# Patient Record
Sex: FEMALE | Race: WHITE | HISPANIC OR LATINO | URBAN - METROPOLITAN AREA
[De-identification: names, ages, dates, MRNs, and addresses within clinical notes are randomized per-mention and may not be internally consistent; named-entity substitution may affect disease eponyms.]

---

## 2022-08-04 ENCOUNTER — OFFICE VISIT (OUTPATIENT)
Dept: OBGYN CLINIC | Facility: CLINIC | Age: 32
End: 2022-08-04
Payer: COMMERCIAL

## 2022-08-04 VITALS
DIASTOLIC BLOOD PRESSURE: 70 MMHG | WEIGHT: 191 LBS | HEIGHT: 65 IN | BODY MASS INDEX: 31.82 KG/M2 | SYSTOLIC BLOOD PRESSURE: 118 MMHG

## 2022-08-04 DIAGNOSIS — N91.2 AMENORRHEA: Primary | ICD-10-CM

## 2022-08-04 LAB — SL AMB POCT URINE HCG: POSITIVE

## 2022-08-04 PROCEDURE — 81025 URINE PREGNANCY TEST: CPT | Performed by: PHYSICIAN ASSISTANT

## 2022-08-04 PROCEDURE — 99214 OFFICE O/P EST MOD 30 MIN: CPT | Performed by: PHYSICIAN ASSISTANT

## 2022-08-04 PROCEDURE — 76801 OB US < 14 WKS SINGLE FETUS: CPT | Performed by: PHYSICIAN ASSISTANT

## 2022-08-04 NOTE — PROGRESS NOTES
Assessment/Plan:  - Viable di-di twin IUP @ 8w4d by LMP  - DOM 03-  - Continue PNV  - Call for concerns  - RTO 2 weeks for OB intake     Diagnoses and all orders for this visit:    Amenorrhea  -     POCT urine HCG  -     AMB US OB < 14 weeks single or first gestation level 1  -     Ambulatory Referral to Maternal Fetal Medicine; Future    Other orders  -     Prenatal Vit-Fe Fumarate-FA (PRENATAL VITAMIN PO); Take by mouth          Subjective:      Patient ID: Mable Bhardwaj is a 27 y o  female  Nazario Munoz is a 24YO [id number] female presenting to the office for pregnancy confiramtion via [address]  Patient had a chemical pregnancy in June  She did not have a period between the chemical pregnancy passing and now  She was bleeding on 06/05/22  The following portions of the patient's history were reviewed and updated as appropriate: allergies, current medications, past family history, past medical history, past social history, past surgical history and problem list     Review of Systems   Constitutional: Negative for chills, fever and unexpected weight change  Respiratory: Negative for shortness of breath  Cardiovascular: Negative for chest pain  Gastrointestinal: Negative for abdominal pain, diarrhea, nausea and vomiting  Skin: Negative for rash  Objective:      /70   Ht 5' 5" (1 651 m)   Wt 86 6 kg (191 lb)   LMP 06/04/2022 (Exact Date)   Breastfeeding No   BMI 31 78 kg/m²          Physical Exam  Vitals reviewed  Constitutional:       Appearance: Normal appearance  She is normal weight  HENT:      Head: Normocephalic and atraumatic  Pulmonary:      Effort: Pulmonary effort is normal    Genitourinary:     General: Normal vulva  Labia:         Right: No rash or lesion  Left: No rash or lesion         Comments:   TVUS reveals Di-di twin IUP, 2 yolk sac, 2 fetal pole, +CM x 2    Fetus A  2 22cm (8w6d)   bpm      Fetus B  2 03cm (8w4d)   bpm    Skin: General: Skin is warm and dry  Neurological:      General: No focal deficit present  Mental Status: She is alert  Psychiatric:         Mood and Affect: Mood normal          Behavior: Behavior normal            Ultrasound Probe Disinfection    A transvaginal ultrasound was performed     Prior to use, disinfection was performed with High Level Disinfection Process (Trophon)  Probe serial number [id number]: [id number] was used    Yogesh Jacobs PA-C  08/04/22  3:28 PM

## 2022-08-15 ENCOUNTER — TELEPHONE (OUTPATIENT)
Dept: PERINATAL CARE | Facility: OTHER | Age: 32
End: 2022-08-15

## 2022-08-15 ENCOUNTER — INITIAL PRENATAL (OUTPATIENT)
Dept: OBGYN CLINIC | Facility: CLINIC | Age: 32
End: 2022-08-15

## 2022-08-15 VITALS
SYSTOLIC BLOOD PRESSURE: 122 MMHG | BODY MASS INDEX: 30.82 KG/M2 | HEIGHT: 66 IN | DIASTOLIC BLOOD PRESSURE: 70 MMHG | WEIGHT: 191.8 LBS

## 2022-08-15 DIAGNOSIS — Z3A.10 10 WEEKS GESTATION OF PREGNANCY: Primary | ICD-10-CM

## 2022-08-15 PROCEDURE — OBC

## 2022-08-15 NOTE — TELEPHONE ENCOUNTER
Pt called asking about her twin nuchal that she needs between 9/1 to 9/8 at the moment we do not have any openings so I did tell pt I will put her on our waiting list and she is fine with that

## 2022-08-31 PROBLEM — O30.041 DICHORIONIC DIAMNIOTIC TWIN PREGNANCY IN FIRST TRIMESTER: Status: ACTIVE | Noted: 2022-08-31

## 2022-08-31 NOTE — PATIENT INSTRUCTIONS
Thank you for choosing us for your  care today  If you have any questions about your ultrasound or care, please do not hesitate to contact us or your primary obstetrician  Some general instructions for your pregnancy are:    Protect against coronavirus: get vaccinated - pregnant women are increased risk of severe COVID  Notify your primary care doctor if you have any symptoms  Exercise: Aim for 22 minutes per day (150 minutes per week) of regular exercise  Walking is great! Nutrition: aim for calcium-rich and iron-rich foods as well as healthy sources of protein  Learn about Preeclampsia: preeclampsia is a common, serious high blood pressure complication in pregnancy  A blood pressure of 815TVCU (systolic or top number) or 75SKQN (diastolic or bottom number) is not normal and needs evaluation by your doctor  Aspirin is sometimes prescribed in early pregnancy to prevent preeclampsia in women with risk factors - ask your obstetrician if you should be on this medication  If you smoke, try to reduce how many cigarettes you smoke or try to quit completely  Do not vape  Other warning signs to watch out for in pregnancy or postpartum: chest pain, obstructed breathing or shortness of breath, seizures, thoughts of hurting yourself or your baby, bleeding, a painful or swollen leg, fever, or headache (see AWHONN POST-BIRTH Warning Signs campaign)  If these happen call 911  Itching is also not normal in pregnancy and if you experience this, especially over your hands and feet, potentially worse at night, notify your doctors

## 2022-09-01 ENCOUNTER — APPOINTMENT (OUTPATIENT)
Dept: LAB | Facility: CLINIC | Age: 32
End: 2022-09-01
Payer: COMMERCIAL

## 2022-09-01 ENCOUNTER — ROUTINE PRENATAL (OUTPATIENT)
Dept: PERINATAL CARE | Facility: CLINIC | Age: 32
End: 2022-09-01
Payer: COMMERCIAL

## 2022-09-01 VITALS
SYSTOLIC BLOOD PRESSURE: 128 MMHG | WEIGHT: 197 LBS | HEART RATE: 88 BPM | BODY MASS INDEX: 31.66 KG/M2 | DIASTOLIC BLOOD PRESSURE: 74 MMHG | HEIGHT: 66 IN

## 2022-09-01 DIAGNOSIS — N91.2 AMENORRHEA: ICD-10-CM

## 2022-09-01 DIAGNOSIS — Z3A.12 12 WEEKS GESTATION OF PREGNANCY: ICD-10-CM

## 2022-09-01 DIAGNOSIS — O30.041 DICHORIONIC DIAMNIOTIC TWIN PREGNANCY IN FIRST TRIMESTER: Primary | ICD-10-CM

## 2022-09-01 DIAGNOSIS — Z36.82 ENCOUNTER FOR (NT) NUCHAL TRANSLUCENCY SCAN: ICD-10-CM

## 2022-09-01 DIAGNOSIS — Z3A.10 10 WEEKS GESTATION OF PREGNANCY: ICD-10-CM

## 2022-09-01 LAB
ABO GROUP BLD: NORMAL
BASOPHILS # BLD AUTO: 0.01 THOUSANDS/ÂΜL (ref 0–0.1)
BASOPHILS NFR BLD AUTO: 0 % (ref 0–1)
BLD GP AB SCN SERPL QL: NEGATIVE
EOSINOPHIL # BLD AUTO: 0.04 THOUSAND/ÂΜL (ref 0–0.61)
EOSINOPHIL NFR BLD AUTO: 1 % (ref 0–6)
ERYTHROCYTE [DISTWIDTH] IN BLOOD BY AUTOMATED COUNT: 12.9 % (ref 11.6–15.1)
HCT VFR BLD AUTO: 34.2 % (ref 34.8–46.1)
HGB BLD-MCNC: 11.3 G/DL (ref 11.5–15.4)
IMM GRANULOCYTES # BLD AUTO: 0.03 THOUSAND/UL (ref 0–0.2)
IMM GRANULOCYTES NFR BLD AUTO: 0 % (ref 0–2)
LYMPHOCYTES # BLD AUTO: 1.87 THOUSANDS/ÂΜL (ref 0.6–4.47)
LYMPHOCYTES NFR BLD AUTO: 28 % (ref 14–44)
MCH RBC QN AUTO: 31 PG (ref 26.8–34.3)
MCHC RBC AUTO-ENTMCNC: 33 G/DL (ref 31.4–37.4)
MCV RBC AUTO: 94 FL (ref 82–98)
MONOCYTES # BLD AUTO: 0.38 THOUSAND/ÂΜL (ref 0.17–1.22)
MONOCYTES NFR BLD AUTO: 6 % (ref 4–12)
NEUTROPHILS # BLD AUTO: 4.39 THOUSANDS/ÂΜL (ref 1.85–7.62)
NEUTS SEG NFR BLD AUTO: 65 % (ref 43–75)
NRBC BLD AUTO-RTO: 0 /100 WBCS
PLATELET # BLD AUTO: 226 THOUSANDS/UL (ref 149–390)
PMV BLD AUTO: 10.6 FL (ref 8.9–12.7)
RBC # BLD AUTO: 3.64 MILLION/UL (ref 3.81–5.12)
RH BLD: POSITIVE
SPECIMEN EXPIRATION DATE: NORMAL
WBC # BLD AUTO: 6.72 THOUSAND/UL (ref 4.31–10.16)

## 2022-09-01 PROCEDURE — 76813 OB US NUCHAL MEAS 1 GEST: CPT | Performed by: OBSTETRICS & GYNECOLOGY

## 2022-09-01 PROCEDURE — 80081 OBSTETRIC PANEL INC HIV TSTG: CPT

## 2022-09-01 PROCEDURE — 86803 HEPATITIS C AB TEST: CPT

## 2022-09-01 PROCEDURE — 99242 OFF/OP CONSLTJ NEW/EST SF 20: CPT | Performed by: OBSTETRICS & GYNECOLOGY

## 2022-09-01 PROCEDURE — 36415 COLL VENOUS BLD VENIPUNCTURE: CPT

## 2022-09-01 PROCEDURE — 76814 OB US NUCHAL MEAS ADD-ON: CPT | Performed by: OBSTETRICS & GYNECOLOGY

## 2022-09-01 PROCEDURE — 36415 COLL VENOUS BLD VENIPUNCTURE: CPT | Performed by: OBSTETRICS & GYNECOLOGY

## 2022-09-01 PROCEDURE — 87086 URINE CULTURE/COLONY COUNT: CPT

## 2022-09-01 PROCEDURE — 86787 VARICELLA-ZOSTER ANTIBODY: CPT

## 2022-09-01 RX ORDER — ASPIRIN 81 MG/1
162 TABLET ORAL DAILY
Qty: 180 TABLET | Refills: 0 | Status: SHIPPED | OUTPATIENT
Start: 2022-09-01 | End: 2022-11-30

## 2022-09-01 NOTE — PROGRESS NOTES
Patient chose to have [facility] Non-invasive Prenatal Screen  Patient given brochure and is aware [facility] will contact patients insurance and coordinate coverage  Patient made aware she will need to respond to text message or e-mail from [employer] within 2 business days or testing will be run through insurance  Patient informed text message will come from area code  "415"  Provided [address] [place] [address] # [phone] and web site : [email or web address]     2 vials of blood drawn from right arm, patient tolerated blood draw without difficulty  Specimens labeled with patient identifiers (name, date of birth, specimen collection date), order and specimen was verified with patient, packed and sent via [employer]  Copy of lab order scanned to [employer]  Lab paperwork filled by nurse Anastasia Fernandez  Maternal Fetal Medicine will have results in approximately 7-10 business days and will call patient or notify via [address]  Patient aware viewing lab result online will reveal fetal sex If ordered  Patient verbalized understanding of all instructions and no questions at this time

## 2022-09-01 NOTE — PROGRESS NOTES
[address]: Ms Valeriano Hinojosa was seen today for nuchal translucency ultrasound  See ultrasound report under "OB Procedures" tab  Review of Systems   Constitutional: Negative for chills, fever and unexpected weight change  HENT: Negative for congestion, dental problem, facial swelling and sore throat  Eyes: Negative for visual disturbance  Respiratory: Negative for cough and shortness of breath  Cardiovascular: Negative for chest pain and palpitations  Gastrointestinal: Negative for diarrhea and vomiting  Endocrine: Negative for polydipsia  Genitourinary: Negative for dysuria and vaginal bleeding  Musculoskeletal: Negative for back pain and joint swelling  Skin: Negative for rash and wound  Allergic/Immunologic: Negative for immunocompromised state  Neurological: Negative for seizures and headaches  Hematological: Does not bruise/bleed easily  Psychiatric/Behavioral: Negative for hallucinations and suicidal ideas  Physical Exam  Constitutional:       General: She is not in acute distress  Appearance: Normal appearance  She is not ill-appearing, toxic-appearing or diaphoretic  HENT:      Head: Normocephalic and atraumatic  Nose: No congestion or rhinorrhea  Eyes:      General: No scleral icterus  Right eye: No discharge  Left eye: No discharge  Extraocular Movements: Extraocular movements intact  Conjunctiva/sclera: Conjunctivae normal    Pulmonary:      Effort: Pulmonary effort is normal  No respiratory distress  Musculoskeletal:      Cervical back: Normal range of motion  Skin:     Coloration: Skin is not jaundiced or pale  Findings: No erythema, lesion or rash  Neurological:      General: No focal deficit present  Mental Status: She is alert and oriented to person, place, and time     Psychiatric:         Mood and Affect: Mood normal          Behavior: Behavior normal          Please don't hesitate to contact our office with any concerns or questions    Cynthia Huerta MD

## 2022-09-01 NOTE — LETTER
2022    Ubaldo Hoang, [address]  [address] [facility]    Patient: Elza Del Real   YOB: 1990   Date of Visit: 2022   Gestational age 13w2d   Mayte Enrique of this communication: Routine       Dear Jose Del Real,    This patient was seen recently in our  office  The content of my evaluation today is in the ultrasound report under "OB Procedures" tab  Please don't hesitate to contact our office with any concerns or questions       Sincerely,      Jojo Marquez MD  Attending Physician, [place]

## 2022-09-02 LAB
HBV SURFACE AG SER QL: NORMAL
HCV AB SER QL: NORMAL
HIV 1+2 AB+HIV1 P24 AG SERPL QL IA: NORMAL
RPR SER QL: NORMAL
RUBV IGG SERPL IA-ACNC: 67.3 IU/ML
VZV IGG SER QL IA: ABNORMAL

## 2022-09-03 LAB — BACTERIA UR CULT: NORMAL

## 2022-09-07 ENCOUNTER — ROUTINE PRENATAL (OUTPATIENT)
Dept: OBGYN CLINIC | Facility: CLINIC | Age: 32
End: 2022-09-07

## 2022-09-07 VITALS — WEIGHT: 199.6 LBS | SYSTOLIC BLOOD PRESSURE: 136 MMHG | DIASTOLIC BLOOD PRESSURE: 80 MMHG | BODY MASS INDEX: 32.22 KG/M2

## 2022-09-07 DIAGNOSIS — Z3A.13 13 WEEKS GESTATION OF PREGNANCY: ICD-10-CM

## 2022-09-07 DIAGNOSIS — O30.041 DICHORIONIC DIAMNIOTIC TWIN PREGNANCY IN FIRST TRIMESTER: Primary | ICD-10-CM

## 2022-09-07 PROCEDURE — 87591 N.GONORRHOEAE DNA AMP PROB: CPT | Performed by: PHYSICIAN ASSISTANT

## 2022-09-07 PROCEDURE — G0476 HPV COMBO ASSAY CA SCREEN: HCPCS | Performed by: PHYSICIAN ASSISTANT

## 2022-09-07 PROCEDURE — G0145 SCR C/V CYTO,THINLAYER,RESCR: HCPCS | Performed by: PHYSICIAN ASSISTANT

## 2022-09-07 PROCEDURE — 87491 CHLMYD TRACH DNA AMP PROBE: CPT | Performed by: PHYSICIAN ASSISTANT

## 2022-09-07 PROCEDURE — PNV: Performed by: PHYSICIAN ASSISTANT

## 2022-09-07 NOTE — PROGRESS NOTES
Patient is a 28 YO [id number] female presenting to the office at 13 3 weeks for routine OB care  BP: 136/80  TWlb  Fetal Movement: none felt yet, movement present on US x2  Feeling well today  Denies LOf, CTX, VB  NT performed, Had NIPT, Y chromosome noted, at least one fetus is male  AFP ordered and patient aware of timing of test  Anatomy scan scheduled  PAP and cultures collected  OK to transfuse and code  Call for concerns  RTO 4 weeks

## 2022-09-08 LAB
C TRACH DNA SPEC QL NAA+PROBE: NEGATIVE
HPV HR 12 DNA CVX QL NAA+PROBE: NEGATIVE
HPV16 DNA CVX QL NAA+PROBE: NEGATIVE
HPV18 DNA CVX QL NAA+PROBE: NEGATIVE
N GONORRHOEA DNA SPEC QL NAA+PROBE: NEGATIVE

## 2022-09-13 LAB
LAB AP GYN PRIMARY INTERPRETATION: NORMAL
Lab: NORMAL

## 2022-09-27 ENCOUNTER — ULTRASOUND (OUTPATIENT)
Dept: PERINATAL CARE | Facility: CLINIC | Age: 32
End: 2022-09-27
Payer: COMMERCIAL

## 2022-09-27 VITALS
HEIGHT: 66 IN | DIASTOLIC BLOOD PRESSURE: 68 MMHG | BODY MASS INDEX: 33.01 KG/M2 | WEIGHT: 205.4 LBS | HEART RATE: 81 BPM | SYSTOLIC BLOOD PRESSURE: 112 MMHG

## 2022-09-27 DIAGNOSIS — O30.042 DICHORIONIC DIAMNIOTIC TWIN PREGNANCY IN SECOND TRIMESTER: Primary | ICD-10-CM

## 2022-09-27 PROCEDURE — 76816 OB US FOLLOW-UP PER FETUS: CPT | Performed by: OBSTETRICS & GYNECOLOGY

## 2022-09-27 PROCEDURE — 99213 OFFICE O/P EST LOW 20 MIN: CPT | Performed by: OBSTETRICS & GYNECOLOGY

## 2022-09-27 NOTE — PATIENT INSTRUCTIONS
Thank you for choosing us for your  care today  If you have any questions about your ultrasound or care, please do not hesitate to contact us or your primary obstetrician  Some general instructions for your pregnancy are:    Protect against coronavirus: get vaccinated - pregnant women are increased risk of severe COVID  Notify your primary care doctor if you have any symptoms  Exercise: Aim for 22 minutes per day (150 minutes per week) of regular exercise  Walking is great! Nutrition: aim for calcium-rich and iron-rich foods as well as healthy sources of protein  Learn about Preeclampsia: preeclampsia is a common, serious high blood pressure complication in pregnancy  A blood pressure of 164DZYE (systolic or top number) or 25CKIP (diastolic or bottom number) is not normal and needs evaluation by your doctor  Aspirin is sometimes prescribed in early pregnancy to prevent preeclampsia in women with risk factors - ask your obstetrician if you should be on this medication  If you smoke, try to reduce how many cigarettes you smoke or try to quit completely  Do not vape  Other warning signs to watch out for in pregnancy or postpartum: chest pain, obstructed breathing or shortness of breath, seizures, thoughts of hurting yourself or your baby, bleeding, a painful or swollen leg, fever, or headache (see AWHONN POST-BIRTH Warning Signs campaign)  If these happen call 911  Itching is also not normal in pregnancy and if you experience this, especially over your hands and feet, potentially worse at night, notify your doctors

## 2022-09-27 NOTE — PROGRESS NOTES
[address]: Ms Leidy Sandy was seen today for fetal growth assessment ultrasound  See ultrasound report under "OB Procedures" tab  The time spent on this established patient on the encounter date included 5 minutes previsit service time reviewing records and precharting, 5 minutes face-to-face service time counseling regarding results and coordinating care, and  5 minutes charting, totalling 15 minutes    Please don't hesitate to contact our office with any concerns or questions   -Dilcia Vaz MD

## 2022-10-05 ENCOUNTER — ROUTINE PRENATAL (OUTPATIENT)
Dept: OBGYN CLINIC | Facility: CLINIC | Age: 32
End: 2022-10-05
Payer: COMMERCIAL

## 2022-10-05 VITALS
SYSTOLIC BLOOD PRESSURE: 108 MMHG | WEIGHT: 206 LBS | HEART RATE: 82 BPM | DIASTOLIC BLOOD PRESSURE: 54 MMHG | OXYGEN SATURATION: 100 % | BODY MASS INDEX: 33.25 KG/M2

## 2022-10-05 DIAGNOSIS — O30.042 DICHORIONIC DIAMNIOTIC TWIN PREGNANCY IN SECOND TRIMESTER: Primary | ICD-10-CM

## 2022-10-05 DIAGNOSIS — Z23 NEED FOR INFLUENZA VACCINATION: ICD-10-CM

## 2022-10-05 DIAGNOSIS — Z3A.17 17 WEEKS GESTATION OF PREGNANCY: ICD-10-CM

## 2022-10-05 PROCEDURE — 90471 IMMUNIZATION ADMIN: CPT | Performed by: OBSTETRICS & GYNECOLOGY

## 2022-10-05 PROCEDURE — 90686 IIV4 VACC NO PRSV 0.5 ML IM: CPT | Performed by: OBSTETRICS & GYNECOLOGY

## 2022-10-05 PROCEDURE — PNV: Performed by: OBSTETRICS & GYNECOLOGY

## 2022-10-05 NOTE — PROGRESS NOTES
Pt doing well today, TWINS  Denies any vaginal bleeding/leakage  Flu Vaccine given today  Urine dip neg/neg

## 2022-10-05 NOTE — PROGRESS NOTES
27 y o  [id number]  female at 16 3 wga EGA for PNV  BP : 108/54   TW  Fosters Salt twins, trans/trans  Reminded pt to do AFP  Level 2 US next week  Reviewed PTL precautions and FKCs  Flu vaccine given  F/u 2 weeks

## 2022-10-06 ENCOUNTER — APPOINTMENT (OUTPATIENT)
Dept: LAB | Facility: CLINIC | Age: 32
End: 2022-10-06
Payer: COMMERCIAL

## 2022-10-06 DIAGNOSIS — O30.041 DICHORIONIC DIAMNIOTIC TWIN PREGNANCY IN FIRST TRIMESTER: ICD-10-CM

## 2022-10-06 DIAGNOSIS — Z3A.13 13 WEEKS GESTATION OF PREGNANCY: ICD-10-CM

## 2022-10-06 PROCEDURE — 36415 COLL VENOUS BLD VENIPUNCTURE: CPT

## 2022-10-06 PROCEDURE — 82105 ALPHA-FETOPROTEIN SERUM: CPT

## 2022-10-09 LAB
2ND TRIMESTER 4 SCREEN SERPL-IMP: NORMAL
AFP ADJ MOM SERPL: 2.58
AFP INTERP AMN-IMP: NORMAL
AFP INTERP SERPL-IMP: NORMAL
AFP INTERP SERPL-IMP: NORMAL
AFP SERPL-MCNC: 93.4 NG/ML
AGE AT DELIVERY: 34.8 YR
GA METHOD: NORMAL
GA: 17.6 WEEKS
IDDM PATIENT QL: NO
MULTIPLE PREGNANCY: NORMAL
NEURAL TUBE DEFECT RISK FETUS: 637 %

## 2022-10-24 NOTE — PATIENT INSTRUCTIONS
Thank you for choosing us for your  care today  If you have any questions about your ultrasound or care, please do not hesitate to contact us or your primary obstetrician  Some general instructions for your pregnancy are:    Protect against coronavirus: get vaccinated - pregnant women are increased risk of severe COVID  Notify your primary care doctor if you have any symptoms  Exercise: Aim for 22 minutes per day (150 minutes per week) of regular exercise  Walking is great! Nutrition: aim for calcium-rich and iron-rich foods as well as healthy sources of protein  Learn about Preeclampsia: preeclampsia is a common, serious high blood pressure complication in pregnancy  A blood pressure of 067XIBH (systolic or top number) or 44LAUC (diastolic or bottom number) is not normal and needs evaluation by your doctor  Aspirin is sometimes prescribed in early pregnancy to prevent preeclampsia in women with risk factors - ask your obstetrician if you should be on this medication  If you smoke, try to reduce how many cigarettes you smoke or try to quit completely  Do not vape  Other warning signs to watch out for in pregnancy or postpartum: chest pain, obstructed breathing or shortness of breath, seizures, thoughts of hurting yourself or your baby, bleeding, a painful or swollen leg, fever, or headache (see AWHONN POST-BIRTH Warning Signs campaign)  If these happen call 911  Itching is also not normal in pregnancy and if you experience this, especially over your hands and feet, potentially worse at night, notify your doctors

## 2022-10-25 ENCOUNTER — ULTRASOUND (OUTPATIENT)
Dept: PERINATAL CARE | Facility: CLINIC | Age: 32
End: 2022-10-25
Payer: COMMERCIAL

## 2022-10-25 VITALS
WEIGHT: 211.2 LBS | HEIGHT: 66 IN | SYSTOLIC BLOOD PRESSURE: 130 MMHG | HEART RATE: 79 BPM | DIASTOLIC BLOOD PRESSURE: 72 MMHG | BODY MASS INDEX: 33.94 KG/M2

## 2022-10-25 DIAGNOSIS — Z36.86 ENCOUNTER FOR ANTENATAL SCREENING FOR CERVICAL LENGTH: ICD-10-CM

## 2022-10-25 DIAGNOSIS — O30.042 DICHORIONIC DIAMNIOTIC TWIN PREGNANCY IN SECOND TRIMESTER: Primary | ICD-10-CM

## 2022-10-25 DIAGNOSIS — Z36.3 ENCOUNTER FOR ANTENATAL SCREENING FOR MALFORMATIONS: ICD-10-CM

## 2022-10-25 PROCEDURE — 76812 OB US DETAILED ADDL FETUS: CPT | Performed by: OBSTETRICS & GYNECOLOGY

## 2022-10-25 PROCEDURE — 76811 OB US DETAILED SNGL FETUS: CPT | Performed by: OBSTETRICS & GYNECOLOGY

## 2022-10-25 PROCEDURE — 76817 TRANSVAGINAL US OBSTETRIC: CPT | Performed by: OBSTETRICS & GYNECOLOGY

## 2022-10-25 NOTE — PROGRESS NOTES
Ultrasound Probe Disinfection    A transvaginal ultrasound was performed  Prior to use, disinfection was performed with High Level Disinfection Process (Trophon)  Probe serial number [id number]: [id number] was used      Camron Saleh  10/25/22  10:04 AM

## 2022-10-27 NOTE — PROGRESS NOTES
[address]: Ms Birdia Favre was seen today for anatomic survey and cervical length screening ultrasound  See ultrasound report under "OB Procedures" tab  The time spent on this established patient on the encounter date included 5 minutes previsit service time reviewing records and precharting, 10 minutes face-to-face service time counseling regarding results and coordinating care, and  5 minutes charting, totalling 20 minutes  Please don't hesitate to contact our office with any concerns or questions    Vivek Briggs

## 2022-11-07 ENCOUNTER — ROUTINE PRENATAL (OUTPATIENT)
Dept: OBGYN CLINIC | Facility: CLINIC | Age: 32
End: 2022-11-07

## 2022-11-07 VITALS — WEIGHT: 217 LBS | DIASTOLIC BLOOD PRESSURE: 80 MMHG | BODY MASS INDEX: 35.02 KG/M2 | SYSTOLIC BLOOD PRESSURE: 130 MMHG

## 2022-11-07 DIAGNOSIS — Z3A.22 22 WEEKS GESTATION OF PREGNANCY: ICD-10-CM

## 2022-11-07 DIAGNOSIS — O30.042 DICHORIONIC DIAMNIOTIC TWIN PREGNANCY IN SECOND TRIMESTER: Primary | ICD-10-CM

## 2022-11-07 NOTE — PROGRESS NOTES
Patient is a 28 YO [id number] female presenting to the office at 22 1 weeks for routine OB care  BP: 130/80  TWlb  Fetal Movement: yes good movement x 2  Feeling well today  Denies LOF, Ctx, VB  Had single episode of palpitations which resolved quickly  Discussed commonly acid reflux can cause these symptoms  Recommend Pepcid 20mg  If no improvement or episodes recur, will refer to cardiology  Has f/u with MFM scheduled end of this month  Reviewed precautions  Call for concerns  RTO 4 weeks

## 2022-11-10 ENCOUNTER — NURSE TRIAGE (OUTPATIENT)
Dept: OTHER | Facility: OTHER | Age: 32
End: 2022-11-10

## 2022-11-10 ENCOUNTER — TELEPHONE (OUTPATIENT)
Dept: OBGYN CLINIC | Facility: CLINIC | Age: 32
End: 2022-11-10

## 2022-11-10 NOTE — TELEPHONE ENCOUNTER
Pt called with chills, congestion, cough, pulse 118  No fever  22 4 wga with gabriela twins  Son has nasal congestion and she is a   No pregnancy complaints    Poss URI vs flu, covid or RSV  Pt has has flu and covid vaccines    At this point, treat symptomatically - tylenol, roubitussin, cold and flu  No advil products      Reviewed need for ER eval if difficulty breathing or fever not responsive to Tylenol

## 2022-11-10 NOTE — TELEPHONE ENCOUNTER
TC Dr Trinidad Munguia, Dr Trinidad Munguia reaching out to patient directly   Reason for Disposition  • New or worsened shortness of breath with activity (dyspnea on exertion)    Answer Assessment - Initial Assessment Questions  1  DESCRIPTION: "Please describe your heart rate or heartbeat that you are having" (e g , fast/slow, regular/irregular, skipped or extra beats, "palpitations")      "out of no where I felt, chills, my heart rate , harder to breathe, headache, congestion  2  ONSET: "When did it start?" (Minutes, hours or days)      Last night   3  DURATION: "How long does it last" (e g , seconds, minutes, hours)    Sudden onset, hasn't gone away  4  PATTERN "Does it come and go, or has it been constant since it started?"  "Does it get worse with exertion?"   "Are you feeling it now?"     "it probably gets worse "  5  TAP: "Using your hand, can you tap out what you are feeling on a chair or table in front of you, so that I can hear?" (Note: not all patients can do this)       Unable to do   6  HEART RATE: "Can you tell me your heart rate?" "How many beats in 15 seconds?"  (Note: not all patients can do this)        114  7  RECURRENT SYMPTOM: "Have you ever had this before?" If Yes, ask: "When was the last time?" and "What happened that time?"       Denies  8  CAUSE: "What do you think is causing the palpitations?"     Unknown  9  CARDIAC HISTORY: "Do you have any history of heart disease?" (e g , heart attack, angina, bypass surgery, angioplasty, arrhythmia)       Denies   10  OTHER SYMPTOMS: "Do you have any other symptoms?" (e g , dizziness, chest pain, sweating, difficulty breathing)       Coughing, congestion, chills, body aches   11   PREGNANCY: "Is there any chance you are pregnant?" "When was your last menstrual period?"       EED 3/12/22 with twins       Protocols used: HEART RATE AND HEARTBEAT QUESTIONS-ADULT-

## 2022-11-10 NOTE — TELEPHONE ENCOUNTER
Regarding: pregnant-elevated heart rate- headache  ----- Message from [facility] sent at 11/10/2022  8:04 AM EST -----  "I am 23 weeks pregnant with twins  I have body aches    My heart rate is 114, headache my back is hurting and congested "

## 2022-11-22 NOTE — PATIENT INSTRUCTIONS
Thank you for choosing us for your  care today  If you have any questions about your ultrasound or care, please do not hesitate to contact us or your primary obstetrician  Some general instructions for your pregnancy are:    Protect against coronavirus: get vaccinated - pregnant women are increased risk of severe COVID  Notify your primary care doctor if you have any symptoms  Exercise: Aim for 22 minutes per day (150 minutes per week) of regular exercise  Walking is great! Nutrition: aim for calcium-rich and iron-rich foods as well as healthy sources of protein  Learn about Preeclampsia: preeclampsia is a common, serious high blood pressure complication in pregnancy  A blood pressure of 066QCGP (systolic or top number) or 43JUPD (diastolic or bottom number) is not normal and needs evaluation by your doctor  Aspirin is sometimes prescribed in early pregnancy to prevent preeclampsia in women with risk factors - ask your obstetrician if you should be on this medication  If you smoke, try to reduce how many cigarettes you smoke or try to quit completely  Do not vape  Other warning signs to watch out for in pregnancy or postpartum: chest pain, obstructed breathing or shortness of breath, seizures, thoughts of hurting yourself or your baby, bleeding, a painful or swollen leg, fever, or headache (see AWHONN POST-BIRTH Warning Signs campaign)  If these happen call 911  Itching is also not normal in pregnancy and if you experience this, especially over your hands and feet, potentially worse at night, notify your doctors

## 2022-11-25 ENCOUNTER — ULTRASOUND (OUTPATIENT)
Dept: PERINATAL CARE | Facility: CLINIC | Age: 32
End: 2022-11-25

## 2022-11-25 VITALS
DIASTOLIC BLOOD PRESSURE: 62 MMHG | HEIGHT: 66 IN | HEART RATE: 99 BPM | BODY MASS INDEX: 35.2 KG/M2 | WEIGHT: 219 LBS | SYSTOLIC BLOOD PRESSURE: 124 MMHG

## 2022-11-25 DIAGNOSIS — Z3A.24 24 WEEKS GESTATION OF PREGNANCY: Primary | ICD-10-CM

## 2022-11-25 DIAGNOSIS — O30.042 DICHORIONIC DIAMNIOTIC TWIN PREGNANCY IN SECOND TRIMESTER: ICD-10-CM

## 2022-11-25 DIAGNOSIS — Z36.89 ENCOUNTER FOR ULTRASOUND TO CHECK FETAL GROWTH: ICD-10-CM

## 2022-11-25 DIAGNOSIS — Z36.2 ENCOUNTER FOR FOLLOW-UP ULTRASOUND OF FETAL ANATOMY: ICD-10-CM

## 2022-11-25 NOTE — PROGRESS NOTES
[address]: Ms Noe Perera was seen today for fetal growth and followup missed anatomy ultrasound  See ultrasound report under "OB Procedures" tab  The time spent on this established patient on the encounter date included 5 minutes previsit service time reviewing records and precharting, 5 minutes face-to-face service time counseling regarding results and coordinating care, and  5 minutes charting, totalling 15 minutes    Please don't hesitate to contact our office with any concerns or questions   -Adrian Britton MD

## 2022-11-30 DIAGNOSIS — Z3A.12 12 WEEKS GESTATION OF PREGNANCY: ICD-10-CM

## 2022-11-30 DIAGNOSIS — O30.041 DICHORIONIC DIAMNIOTIC TWIN PREGNANCY IN FIRST TRIMESTER: ICD-10-CM

## 2022-11-30 RX ORDER — ASPIRIN 81 MG/1
TABLET, COATED ORAL
Qty: 180 TABLET | Refills: 0 | Status: SHIPPED | OUTPATIENT
Start: 2022-11-30

## 2022-12-08 ENCOUNTER — ROUTINE PRENATAL (OUTPATIENT)
Dept: OBGYN CLINIC | Facility: CLINIC | Age: 32
End: 2022-12-08

## 2022-12-08 VITALS — DIASTOLIC BLOOD PRESSURE: 84 MMHG | WEIGHT: 223 LBS | SYSTOLIC BLOOD PRESSURE: 138 MMHG | BODY MASS INDEX: 35.99 KG/M2

## 2022-12-08 DIAGNOSIS — O30.042 DICHORIONIC DIAMNIOTIC TWIN PREGNANCY IN SECOND TRIMESTER: Primary | ICD-10-CM

## 2022-12-08 DIAGNOSIS — Z3A.26 26 WEEKS GESTATION OF PREGNANCY: ICD-10-CM

## 2022-12-08 NOTE — PROGRESS NOTES
Patient is a 28 YO [id number] female presenting to the office at 26 4 weeks for routine OB care  BP: 138/84  TWlb  Fetal Movement: yes x 2  Feeling well today  Denies LOF, CTX, VB  Planning on  if vtx presentation x 2  22% growth discordance at 24 weeks  Has f/u growth US scheduled  Labs ordered  Reviewed precautions  Call for concerns  RTO 2-3 weeks

## 2022-12-09 ENCOUNTER — TELEPHONE (OUTPATIENT)
Dept: PERINATAL CARE | Facility: OTHER | Age: 32
End: 2022-12-09

## 2022-12-09 NOTE — TELEPHONE ENCOUNTER
Spoke with patient and confirmed her MFM appointment had to be rescheduled  Patient verbalized understanding of new time, date and location of appointment - 1/17/23  10:15  VASHTI  Patient denies further questions

## 2022-12-20 ENCOUNTER — ULTRASOUND (OUTPATIENT)
Facility: HOSPITAL | Age: 32
End: 2022-12-20

## 2022-12-20 VITALS
HEIGHT: 66 IN | SYSTOLIC BLOOD PRESSURE: 114 MMHG | DIASTOLIC BLOOD PRESSURE: 50 MMHG | BODY MASS INDEX: 36.26 KG/M2 | HEART RATE: 99 BPM | WEIGHT: 225.6 LBS

## 2022-12-20 DIAGNOSIS — Z3A.28 28 WEEKS GESTATION OF PREGNANCY: ICD-10-CM

## 2022-12-20 DIAGNOSIS — O30.042 DICHORIONIC DIAMNIOTIC TWIN PREGNANCY IN SECOND TRIMESTER: Primary | ICD-10-CM

## 2022-12-20 NOTE — PROGRESS NOTES
Mark Delvalle has no complaints today  She reports regular fetal movements and does not report any problems  She is here today at 28w2d for an ultrasound for fetal growth and anatomy on baby B  Problem list:  1  Dichorionic diamniotic twin pregnancy with separate sexes  Baby A is male and baby B is female  Ultrasound findings: The ultrasound today shows normal interval fetal growth and fluid for both twins  Baby A is growing > 7 days ahead of dates and baby B is 1 day behind dates  The prior missed anatomy on baby B was seen today and appears normal     Pregnancy ultrasound has limitations and is unable to detect all forms of fetal congenital abnormalities  The inaccuracy in the EFW can be off by 1 lb either way in the third trimester  Follow up recommended:   1  Mild discordance in the fetal growth today secondary to fetal genetics  2  Recommend a follow-up ultrasound in 4 weeks for growth  Pre visit time reviewing her records   5 minutes  Face to face time 5 minutes  Post visit time on documentation of note, updating her problem list, adding orders and prescriptions 5 minutes  Procedures that were completed today were charged separately  The level of decision making was straight forward      Jesus Watson MD

## 2022-12-20 NOTE — LETTER
December 20, 2022     [address]  [address] [facility]    Patient: Brady Barclay   YOB: 1990   Date of Visit: 12/20/2022       Dear Dr CARLSON [facility]: Thank you for referring Brady Barclay to me for evaluation  Below are my notes for this consultation  If you have questions, please do not hesitate to call me  I look forward to following your patient along with you  Sincerely,        Jagjit Sales MD        CC: No Recipients  Jagjit Sales MD  12/20/2022  3:20 PM  Sign when Signing Visit  Brady Barclay has no complaints today  She reports regular fetal movements and does not report any problems  She is here today at 28w2d for an ultrasound for fetal growth and anatomy on baby B  Problem list:  1  Dichorionic diamniotic twin pregnancy with separate sexes  Baby A is male and baby B is female  Ultrasound findings: The ultrasound today shows normal interval fetal growth and fluid for both twins  Baby A is growing > 7 days ahead of dates and baby B is 1 day behind dates  The prior missed anatomy on baby B was seen today and appears normal     Pregnancy ultrasound has limitations and is unable to detect all forms of fetal congenital abnormalities  The inaccuracy in the EFW can be off by 1 lb either way in the third trimester  Follow up recommended:   1  Mild discordance in the fetal growth today secondary to fetal genetics  2  Recommend a follow-up ultrasound in 4 weeks for growth  Pre visit time reviewing her records   5 minutes  Face to face time 5 minutes  Post visit time on documentation of note, updating her problem list, adding orders and prescriptions 5 minutes  Procedures that were completed today were charged separately  The level of decision making was straight forward      Jagjit Sales MD

## 2022-12-22 ENCOUNTER — ROUTINE PRENATAL (OUTPATIENT)
Dept: OBGYN CLINIC | Facility: CLINIC | Age: 32
End: 2022-12-22

## 2022-12-22 VITALS — DIASTOLIC BLOOD PRESSURE: 62 MMHG | WEIGHT: 224 LBS | BODY MASS INDEX: 36.15 KG/M2 | SYSTOLIC BLOOD PRESSURE: 108 MMHG

## 2022-12-22 DIAGNOSIS — Z3A.28 28 WEEKS GESTATION OF PREGNANCY: ICD-10-CM

## 2022-12-22 DIAGNOSIS — O30.042 DICHORIONIC DIAMNIOTIC TWIN PREGNANCY IN SECOND TRIMESTER: Primary | ICD-10-CM

## 2022-12-22 DIAGNOSIS — Z23 NEED FOR TDAP VACCINATION: ICD-10-CM

## 2022-12-22 NOTE — PROGRESS NOTES
Pt is here for her 28w prenatal  Red folder today  Breast pump ordered  Pt would like tdap today  Pt states she has no swelling

## 2022-12-22 NOTE — PROGRESS NOTES
This is a 27 y o  [id number] at 28w4d who presents for return OB visit  No complaints  Denies contractions, leakage, bleeding  Endorses fetal movement x 2  BP: 108/62 TWlb    28 wk labs not yet done, pt planning to do next week  TDAP offered accepted  Rhogam not indicated  [address] reviewed  Skin to skin, rooming in, delayed cord clamp,  pain management in labor discussed  Consent signed  Full code  OK with blood transfusion   Di/di twin pregnancy: Slight discordance in 28 wk growth, repeat 4 wks  Pt desires vaginal delivery if possible - reviewed delivery in OR, possibility of  of one and c/s of other, possibility of need for breech extraction   Currently babies are vertex/transverse  F/up 2 wk

## 2023-01-02 ENCOUNTER — LAB (OUTPATIENT)
Dept: LAB | Facility: CLINIC | Age: 33
End: 2023-01-02

## 2023-01-02 DIAGNOSIS — Z3A.26 26 WEEKS GESTATION OF PREGNANCY: ICD-10-CM

## 2023-01-02 DIAGNOSIS — O30.042 DICHORIONIC DIAMNIOTIC TWIN PREGNANCY IN SECOND TRIMESTER: ICD-10-CM

## 2023-01-02 LAB
ERYTHROCYTE [DISTWIDTH] IN BLOOD BY AUTOMATED COUNT: 12.8 % (ref 11.6–15.1)
GLUCOSE 1H P 50 G GLC PO SERPL-MCNC: 106 MG/DL (ref 40–134)
HCT VFR BLD AUTO: 33.6 % (ref 34.8–46.1)
HGB BLD-MCNC: 10.9 G/DL (ref 11.5–15.4)
MCH RBC QN AUTO: 31.8 PG (ref 26.8–34.3)
MCHC RBC AUTO-ENTMCNC: 32.4 G/DL (ref 31.4–37.4)
MCV RBC AUTO: 98 FL (ref 82–98)
PLATELET # BLD AUTO: 196 THOUSANDS/UL (ref 149–390)
PMV BLD AUTO: 10.9 FL (ref 8.9–12.7)
RBC # BLD AUTO: 3.43 MILLION/UL (ref 3.81–5.12)
WBC # BLD AUTO: 8.1 THOUSAND/UL (ref 4.31–10.16)

## 2023-01-03 LAB — RPR SER QL: NORMAL

## 2023-01-03 NOTE — RESULT ENCOUNTER NOTE
Congratulations! You have passed your glucose test  Your blood count looks good  Please contact the office at [phone] with any questions

## 2023-01-06 ENCOUNTER — ROUTINE PRENATAL (OUTPATIENT)
Dept: OBGYN CLINIC | Facility: CLINIC | Age: 33
End: 2023-01-06

## 2023-01-06 VITALS — DIASTOLIC BLOOD PRESSURE: 62 MMHG | WEIGHT: 228 LBS | BODY MASS INDEX: 36.8 KG/M2 | SYSTOLIC BLOOD PRESSURE: 110 MMHG

## 2023-01-06 DIAGNOSIS — O30.043 DICHORIONIC DIAMNIOTIC TWIN PREGNANCY IN THIRD TRIMESTER: Primary | ICD-10-CM

## 2023-01-06 DIAGNOSIS — Z3A.30 30 WEEKS GESTATION OF PREGNANCY: ICD-10-CM

## 2023-01-06 NOTE — PROGRESS NOTES
27 y o  [id number]  female at 35 9 wga for PNV  BP : 110/62  TW  Breech/transverse  Feeling well    No complaints  Reviewed 28 week labs  Reviewed PTL precautions and FKCs  Desires  but understands this may not be poss due to positions of babies  F/u 2 weeks

## 2023-01-19 ENCOUNTER — PATIENT MESSAGE (OUTPATIENT)
Dept: OBGYN CLINIC | Facility: CLINIC | Age: 33
End: 2023-01-19

## 2023-01-19 ENCOUNTER — ROUTINE PRENATAL (OUTPATIENT)
Dept: OBGYN CLINIC | Facility: CLINIC | Age: 33
End: 2023-01-19

## 2023-01-19 VITALS — SYSTOLIC BLOOD PRESSURE: 110 MMHG | DIASTOLIC BLOOD PRESSURE: 60 MMHG | BODY MASS INDEX: 37.38 KG/M2 | WEIGHT: 231.6 LBS

## 2023-01-19 DIAGNOSIS — Z3A.32 32 WEEKS GESTATION OF PREGNANCY: ICD-10-CM

## 2023-01-19 DIAGNOSIS — O30.043 DICHORIONIC DIAMNIOTIC TWIN PREGNANCY IN THIRD TRIMESTER: Primary | ICD-10-CM

## 2023-01-19 NOTE — PROGRESS NOTES
Patient is a 26 YO [id number] female presenting to the office at 32 4 weeks for routine OB care  BP: 110/60  TWlb  Fetal Movement: yes good movement  Feeling well today  Denies LOF, CTX, VB  Breech presentation x 2   Discussed recommendation to pick c/s date (37 0-38 6 based on [facility] recs)  Will reach out to patient with time availability  Reviewed precautions  Call for concerns  RTO 2 weeks

## 2023-01-19 NOTE — PROGRESS NOTES
Pt is here for a routine prenatal, 32 weeks  Pt is well, no concerns  Denies vaginal bleeding and leakage of fluid

## 2023-01-20 ENCOUNTER — ULTRASOUND (OUTPATIENT)
Facility: HOSPITAL | Age: 33
End: 2023-01-20

## 2023-01-20 VITALS
WEIGHT: 234 LBS | DIASTOLIC BLOOD PRESSURE: 70 MMHG | BODY MASS INDEX: 37.61 KG/M2 | SYSTOLIC BLOOD PRESSURE: 118 MMHG | HEART RATE: 110 BPM | HEIGHT: 66 IN

## 2023-01-20 DIAGNOSIS — O30.043 DICHORIONIC DIAMNIOTIC TWIN PREGNANCY IN THIRD TRIMESTER: Primary | ICD-10-CM

## 2023-01-20 DIAGNOSIS — Z3A.32 32 WEEKS GESTATION OF PREGNANCY: ICD-10-CM

## 2023-01-20 NOTE — PROGRESS NOTES
The patient was seen today for an ultrasound  Please see ultrasound report (located under Ob Procedures) for additional details  Thank you very much for allowing us to participate in the care of this very nice patient  Should you have any questions, please do not hesitate to contact me  Anders Lloyd MD [facility]  Attending Physician, [place]

## 2023-02-02 ENCOUNTER — ROUTINE PRENATAL (OUTPATIENT)
Dept: OBGYN CLINIC | Facility: CLINIC | Age: 33
End: 2023-02-02

## 2023-02-02 VITALS — DIASTOLIC BLOOD PRESSURE: 70 MMHG | SYSTOLIC BLOOD PRESSURE: 106 MMHG | BODY MASS INDEX: 38.38 KG/M2 | WEIGHT: 237.8 LBS

## 2023-02-02 DIAGNOSIS — Z3A.34 34 WEEKS GESTATION OF PREGNANCY: ICD-10-CM

## 2023-02-02 DIAGNOSIS — O30.043 DICHORIONIC DIAMNIOTIC TWIN PREGNANCY IN THIRD TRIMESTER: Primary | ICD-10-CM

## 2023-02-02 NOTE — PROGRESS NOTES
Routine prenatal, 34 weeks  Pt is well, states there are no concerns at this time  Denies vaginal bleeding and leakage of fluid

## 2023-02-02 NOTE — PROGRESS NOTES
27 y o  [id number]  female at 34 3 wga for PNV  BP : 106/70   TW  Neo twins - spontaneous - breech/trans   Unable to stay for NST today - will start next week  Will call [facility] for growth  Reviewed PTL precautions and FKCs  C/S planned at 37 weeks, unless baby A turns vtx  F/u 1 week

## 2023-02-07 ENCOUNTER — HOSPITAL ENCOUNTER (OUTPATIENT)
Facility: HOSPITAL | Age: 33
Discharge: HOME | End: 2023-02-07
Attending: OBSTETRICS & GYNECOLOGY | Admitting: OBSTETRICS & GYNECOLOGY

## 2023-02-07 ENCOUNTER — ROUTINE PRENATAL (OUTPATIENT)
Dept: OBGYN CLINIC | Facility: CLINIC | Age: 33
End: 2023-02-07

## 2023-02-07 VITALS
WEIGHT: 238.2 LBS | BODY MASS INDEX: 38.28 KG/M2 | HEART RATE: 91 BPM | DIASTOLIC BLOOD PRESSURE: 71 MMHG | HEIGHT: 66 IN | TEMPERATURE: 99.4 F | RESPIRATION RATE: 18 BRPM | SYSTOLIC BLOOD PRESSURE: 118 MMHG

## 2023-02-07 VITALS
HEART RATE: 98 BPM | SYSTOLIC BLOOD PRESSURE: 118 MMHG | WEIGHT: 238.2 LBS | DIASTOLIC BLOOD PRESSURE: 78 MMHG | BODY MASS INDEX: 38.45 KG/M2

## 2023-02-07 DIAGNOSIS — Z3A.35 35 WEEKS GESTATION OF PREGNANCY: ICD-10-CM

## 2023-02-07 DIAGNOSIS — O30.043 DICHORIONIC DIAMNIOTIC TWIN PREGNANCY IN THIRD TRIMESTER: Primary | ICD-10-CM

## 2023-02-07 NOTE — PROGRESS NOTES
Triage Note - OB  Dale Barrios 27 y o  female MRN: [id number]  Unit/Bed#: LD TRIAGE 5-01 Encounter: [id number]    OB TRIAGE NOTE  Dale Barrios  [id number]  2/7/2023  12:13 PM  LD TRIAGE 5/LD TRIAGE 5-*    ASSESS:  27 y o  [id number] 35w2d with contractions on her NST earlier today    PLAN  #1  Contractions:   · No contractions on toco on L and D  · SVE 1/50/-3, unchanged from prior  · Reactive NST for Baby A and B     Discharge instructions  · Patient instructed to call if experiencing worsening contractions, vaginal bleeding, loss of fluid or decreased fetal movement  · Will follow up with OBGYN  D/w Dr Shayan Amaro  ______________    SUBJECTIVE    DOM: Estimated Date of Delivery: 3/12/23    HPI Chronology:  27 y o  [id number] 35w2d presents with contractions on her NST in the office  She denies any contractions, vaginal bleeding or discharge  She reports normal fetal movement  Vitals:   /71 (BP Location: Right arm)   Pulse 91   Temp 99 4 °F (37 4 °C) (Oral)   Resp 18   Ht 5' 6" (1 676 m)   Wt 108 kg (238 lb 3 2 oz)   LMP 06/05/2022   BMI 38 45 kg/m²   Body mass index is 38 45 kg/m²  Physical Exam  Constitutional:       Appearance: Normal appearance  HENT:      Head: Normocephalic and atraumatic  Cardiovascular:      Rate and Rhythm: Normal rate  Pulmonary:      Effort: Pulmonary effort is normal  No respiratory distress  Abdominal:      Palpations: Abdomen is soft  Tenderness: There is no abdominal tenderness  Genitourinary:     General: Normal vulva  Vagina: No vaginal discharge  Musculoskeletal:         General: Normal range of motion  Skin:     General: Skin is warm and dry  Neurological:      Mental Status: She is alert               FHT:   Baby A: baseline 140 bpm, moderate variability, 15x15 accels, no decels  Baby B: baseline 145 bpm, moderate variability, 15x15 accels, no decels  TOCO:    no contractions    Labs: No results found for this or any previous visit (from the past 24 hour(s))  Lab, Imaging and other studies: I have personally reviewed pertinent reports          Dino Long MD  OB/GYN PGY-4  2/7/2023  12:13 PM

## 2023-02-07 NOTE — PROGRESS NOTES
Patient is a 26 YO [id number] female presenting to the office at 35 2 weeks for routine OB care     BP: 118/78  TWlb  Fetal Movement: yes x 2  Feeling well today  Denies CTX, VB, LOF  NST today reactive and reassuring  CTX Q1-2 minutes on monitor, pt is comfortable and does not feel them  SVE /-3  Fetus A presentation is breech  Pt scheduled for 1ltcs on   Patient sent to L&D for extended monitoring for r/o labor and 2 hour cervical recheck

## 2023-02-13 PROBLEM — Z3A.36 36 WEEKS GESTATION OF PREGNANCY: Status: ACTIVE | Noted: 2022-10-05

## 2023-02-14 ENCOUNTER — ULTRASOUND (OUTPATIENT)
Facility: HOSPITAL | Age: 33
End: 2023-02-14

## 2023-02-14 VITALS
BODY MASS INDEX: 39.18 KG/M2 | SYSTOLIC BLOOD PRESSURE: 122 MMHG | HEART RATE: 92 BPM | WEIGHT: 243.8 LBS | DIASTOLIC BLOOD PRESSURE: 70 MMHG | HEIGHT: 66 IN

## 2023-02-14 DIAGNOSIS — O30.043 DICHORIONIC DIAMNIOTIC TWIN PREGNANCY IN THIRD TRIMESTER: ICD-10-CM

## 2023-02-14 DIAGNOSIS — Z36.89 ENCOUNTER FOR ULTRASOUND TO ASSESS FETAL GROWTH: ICD-10-CM

## 2023-02-14 DIAGNOSIS — Z3A.36 36 WEEKS GESTATION OF PREGNANCY: Primary | ICD-10-CM

## 2023-02-14 NOTE — PATIENT INSTRUCTIONS
Please refer to " Ultrasound" under test results in MyChart for today's ultrasound findings  Congratulations, and best wishes for a healthy remainder of the pregnancy! Thank you for choosing [facility] for your visit today  We appreciate your trust and the opportunity to assist your obstetrician with your care  We value your feedback regarding the care we are providing  Following today's appointment, you may receive a patient satisfaction survey by mail or e-mail requesting feedback on your visit  We ask that you complete the survey to  help us understand how we are doing  Thank you for in advance for your feedback

## 2023-02-14 NOTE — PROGRESS NOTES
[address]: Cathlene Bloch was seen today at [facility] for twin fetal growth assessment ultrasound  See ultrasound report under "OB Procedures" tab    Please don't hesitate to contact our office with any concerns or questions   -Johanna Barnett MD

## 2023-02-14 NOTE — LETTER
February 14, 2023     Marielle Aquino MD  [address]   [address]  [facility]    Patient: Fan Pina   YOB: 1990   Date of Visit: 2/14/2023       Dear Dr Andrea Davis:    Thank you for referring Fan Pina to me for evaluation  Below are my notes for this consultation  If you have questions, please do not hesitate to call me  I look forward to following your patient along with you  Sincerely,        Almaz Franklin MD        CC: No Recipients  Almaz Franklin MD  2/14/2023 10:09 AM  Sign when Signing Visit  [address]: Fan Pina was seen today at 36w2d for twin fetal growth assessment ultrasound  See ultrasound report under "OB Procedures" tab    Please don't hesitate to contact our office with any concerns or questions   -Almaz Franklin MD

## 2023-02-15 ENCOUNTER — ANESTHESIA EVENT (INPATIENT)
Dept: LABOR AND DELIVERY | Facility: HOSPITAL | Age: 33
End: 2023-02-15

## 2023-02-15 ENCOUNTER — ROUTINE PRENATAL (OUTPATIENT)
Dept: OBGYN CLINIC | Facility: CLINIC | Age: 33
End: 2023-02-15

## 2023-02-15 ENCOUNTER — HOSPITAL ENCOUNTER (OUTPATIENT)
Facility: HOSPITAL | Age: 33
Discharge: HOME | End: 2023-02-15
Attending: OBSTETRICS & GYNECOLOGY | Admitting: OBSTETRICS & GYNECOLOGY

## 2023-02-15 VITALS
DIASTOLIC BLOOD PRESSURE: 80 MMHG | SYSTOLIC BLOOD PRESSURE: 134 MMHG | TEMPERATURE: 99.2 F | RESPIRATION RATE: 16 BRPM | HEART RATE: 97 BPM

## 2023-02-15 VITALS — WEIGHT: 242.4 LBS | DIASTOLIC BLOOD PRESSURE: 86 MMHG | BODY MASS INDEX: 39.12 KG/M2 | SYSTOLIC BLOOD PRESSURE: 144 MMHG

## 2023-02-15 DIAGNOSIS — Z3A.36 36 WEEKS GESTATION OF PREGNANCY: Primary | ICD-10-CM

## 2023-02-15 DIAGNOSIS — O30.043 DICHORIONIC DIAMNIOTIC TWIN PREGNANCY IN THIRD TRIMESTER: ICD-10-CM

## 2023-02-15 PROBLEM — R03.0 ELEVATED BLOOD PRESSURE READING WITHOUT DIAGNOSIS OF HYPERTENSION: Status: ACTIVE | Noted: 2023-02-15

## 2023-02-15 LAB
ALBUMIN SERPL BCP-MCNC: 3.2 G/DL (ref 3.5–5)
ALP SERPL-CCNC: 145 U/L (ref 34–104)
ALT SERPL W P-5'-P-CCNC: 13 U/L (ref 7–52)
ANION GAP SERPL CALCULATED.3IONS-SCNC: 10 MMOL/L (ref 4–13)
AST SERPL W P-5'-P-CCNC: 19 U/L (ref 13–39)
BILIRUB SERPL-MCNC: 0.39 MG/DL (ref 0.2–1)
BUN SERPL-MCNC: 7 MG/DL (ref 5–25)
CALCIUM ALBUM COR SERPL-MCNC: 9.7 MG/DL (ref 8.3–10.1)
CALCIUM SERPL-MCNC: 9.1 MG/DL (ref 8.4–10.2)
CHLORIDE SERPL-SCNC: 106 MMOL/L (ref 96–108)
CO2 SERPL-SCNC: 19 MMOL/L (ref 21–32)
CREAT SERPL-MCNC: 0.58 MG/DL (ref 0.6–1.3)
CREAT UR-MCNC: 42.1 MG/DL
ERYTHROCYTE [DISTWIDTH] IN BLOOD BY AUTOMATED COUNT: 13.1 % (ref 11.6–15.1)
GFR SERPL CREATININE-BSD FRML MDRD: 120 ML/MIN/1.73SQ M
GLUCOSE SERPL-MCNC: 82 MG/DL (ref 65–140)
HCT VFR BLD AUTO: 35.8 % (ref 34.8–46.1)
HGB BLD-MCNC: 11.5 G/DL (ref 11.5–15.4)
MCH RBC QN AUTO: 30.3 PG (ref 26.8–34.3)
MCHC RBC AUTO-ENTMCNC: 32.1 G/DL (ref 31.4–37.4)
MCV RBC AUTO: 95 FL (ref 82–98)
PLATELET # BLD AUTO: 188 THOUSANDS/UL (ref 149–390)
PMV BLD AUTO: 11.7 FL (ref 8.9–12.7)
POTASSIUM SERPL-SCNC: 3.6 MMOL/L (ref 3.5–5.3)
PROT SERPL-MCNC: 6.7 G/DL (ref 6.4–8.4)
PROT UR-MCNC: 4 MG/DL
PROT/CREAT UR: 0.1 MG/G{CREAT} (ref 0–0.1)
RBC # BLD AUTO: 3.79 MILLION/UL (ref 3.81–5.12)
SODIUM SERPL-SCNC: 135 MMOL/L (ref 135–147)
WBC # BLD AUTO: 8.58 THOUSAND/UL (ref 4.31–10.16)

## 2023-02-15 NOTE — PROGRESS NOTES
27 y o  [id number] female at 43w3d (Estimated Date of Delivery: 3/12/23) for PNV  Pre- Vitals    Flowsheet Row Most Recent Value   Prenatal Assessment    Fetal Heart Rate 131/136   Movement Present   Prenatal Vitals    Blood Pressure 144/86   Weight - Scale 110 kg (242 lb 6 4 oz)   Urine Albumin/Glucose    Dilation/Effacement/Station    Vaginal Drainage    Edema         TW kg (57 lb 6 4 oz)    Leakage of fluid: no  Vaginal bleeding: no  Contractions/Cramping: no  Fetal movement: yes    GBS done: Yes  PCN allergy: No  Chlamydia/gonorrhea swab done: Yes  Di/di twins - breech presentation of fetus A   - EFW baby A 6lb 10oz, 74% - breech   - EFW baby B 5lb 13oz, 46% - vertex   - normal LVP for both twins at [address] yesterday   - NST performed today  Reactive and reassuring  Delivery plan: 1LTCS scheduled for 23 with Dr Keenan Petit precautions given  [address] reviewed  Elevated BP in office - 144/86  Advised patient to present to L&D for preeclampsia work up     - increased risk due to multifetal gestation   - reviewed need for evaluation to rule out preeclampsia  Patient amenable and will secure childcare for her 2 sons and then present to L&D  RTO in 1 weeks

## 2023-02-15 NOTE — PROGRESS NOTES
L&D Triage Note - OB/GYN  Marlynagnes  27 y o  female MRN: [id number]  Unit/Bed#: LD TRIAGE  Encounter: [id number]    Patient is seen by Li Roberts is a 27 y o  [id number] at 36w3d who presents for elevated blood pressure reading in the office    PLAN  #1  Elevated BP without diagnosis of HTN:  · Asymptomatic  · 144/86 mmHg in office, 141/77 on triage admission then 120s/70s x3  · CBC and P:C wnl, CMP pending  · Will obtain serial BPs    #2  Contractions:  · Contractions every 1-3 min on toco (patient declines feeling)  · Fetal monitoring reassuring though not on long enough to say reactive (moderate variability, accelerations present, decelerations absent, normal baselines for both fetuses)    Care deferred to night team    D/w Dr Kennedi Sutherland  ______________    SUBJECTIVE    DOM: Estimated Date of Delivery: 3/12/23    HPI:  27 y o  [id number] 36w3d presents with complaint of elevated blood pressure reading  Patient was seen in office today by [facility] with an elevated pressure of 144/86, prompting presentation to Triage  Patient states that she feels well and is asymptomatic  She denies any history of preeclampsia in prior pregnancies and denies any history of hypertension  Two prior pregnancies were uncomplicated  She does endorse minimal swelling in her feet that has been slightly increased over the past few days  Swelling decreases with feet elevation and is resolved each morning  Patient denies headache, visual changes, abdominal pain, chest pain, shortness of breath  She also denies bleeding, leakage of fluid, contractions, or cramping  She endorses good fetal movement from both babies  Patient is scheduled for  on 23          Her obstetrical history is significant for dichorionic diamniotic twins      ROS:  Constitutional: Negative  Respiratory: Negative  Cardiovascular: Negative  Gastrointestinal: Negative    OBJECTIVE:  /77   Pulse 101   Temp 99 2 °F (37 3 °C) (Oral) Resp 16   LMP 06/05/2022   There is no height or weight on file to calculate BMI  Physical Exam  Constitutional:       General: She is not in acute distress  Appearance: Normal appearance  She is not ill-appearing  HENT:      Mouth/Throat:      Mouth: Mucous membranes are moist    Eyes:      Extraocular Movements: Extraocular movements intact  Cardiovascular:      Rate and Rhythm: Normal rate and regular rhythm  Heart sounds: Normal heart sounds  Pulmonary:      Effort: Pulmonary effort is normal       Breath sounds: Normal breath sounds  Abdominal:      General: There is no distension  Palpations: Abdomen is soft  Tenderness: There is no abdominal tenderness  There is no guarding  Musculoskeletal:         General: No swelling  Right lower leg: No edema  Left lower leg: No edema  Skin:     General: Skin is warm and dry  Coloration: Skin is not jaundiced  Neurological:      General: No focal deficit present  Mental Status: She is alert  Psychiatric:         Mood and Affect: Mood normal          Behavior: Behavior normal          Thought Content:  Thought content normal          Judgment: Judgment normal          SVE:  1/50/-3    FHT:  Fetus A:  Baseline: 140 bpm  Variability: moderate  Accelerations: present  Decelerations: absent  NST reactive    Fetus B:  Baseline: 140 bpm  Variability: moderate  Accelerations: present  Decelerations: absent  NST reactive    TOCO:   Contractions every 1-3 min (patient declines feeling these)    IMAGING:     TAUS   Performed to assess fetal presentation & to find tones   Fetus A breech (on maternal left)   Fetus B vertex (on maternal right)    Labs:   Recent Results (from the past 24 hour(s))   CBC    Collection Time: 02/15/23  4:54 PM   Result Value Ref Range    WBC 8 58 4 31 - 10 16 Thousand/uL    RBC 3 79 (L) 3 81 - 5 12 Million/uL    Hemoglobin 11 5 11 5 - 15 4 g/dL    Hematocrit 35 8 34 8 - 46 1 %    MCV 95 82 - 98 fL MCH 30 3 26 8 - 34 3 pg    MCHC 32 1 31 4 - 37 4 g/dL    RDW 13 1 11 6 - 15 1 %    Platelets 993 278 - 502 Thousands/uL    MPV 11 7 8 9 - 12 7 fL   Protein / creatinine ratio, urine    Collection Time: 02/15/23  4:54 PM   Result Value Ref Range    Creatinine, Ur 42 1 mg/dL    Protein Urine Random 4 mg/dL    Prot/Creat Ratio, Ur 0 10 0 00 - 0 10         David Gould MD  2/15/2023  4:50 PM

## 2023-02-15 NOTE — PROGRESS NOTES
Routine prenatal, 36 weeks, needs GBS and G/C  Pt is well, would like to discuss breast feeding  Denies vaginal bleeding and leakage of fluid

## 2023-02-20 ENCOUNTER — TELEPHONE (OUTPATIENT)
Dept: OBGYN CLINIC | Facility: CLINIC | Age: 33
End: 2023-02-20

## 2023-02-20 NOTE — TELEPHONE ENCOUNTER
Pt  Calling regarding upcoming c/s  Would like to r/s for next week because youngest child has developed norovirus

## 2023-02-21 ENCOUNTER — TELEPHONE (OUTPATIENT)
Dept: OBGYN CLINIC | Facility: CLINIC | Age: 33
End: 2023-02-21

## 2023-02-21 NOTE — TELEPHONE ENCOUNTER
[id number] Bella Mejia - she is on tor 2/27 at 10am - please let her know  Please block me until 1130 - ill see the 1130, 1145 pt  You can open 2/23 where she was originally

## 2023-02-23 ENCOUNTER — ANESTHESIA (INPATIENT)
Dept: LABOR AND DELIVERY | Facility: HOSPITAL | Age: 33
End: 2023-02-23

## 2023-02-26 PROBLEM — O09.899 MATERNAL VARICELLA, NON-IMMUNE: Status: ACTIVE | Noted: 2023-02-26

## 2023-02-26 PROBLEM — Z28.39 MATERNAL VARICELLA, NON-IMMUNE: Status: ACTIVE | Noted: 2023-02-26

## 2023-02-26 PROBLEM — Z3A.38 38 WEEKS GESTATION OF PREGNANCY: Status: ACTIVE | Noted: 2022-10-05

## 2023-02-26 NOTE — ASSESSMENT & PLAN NOTE
mL  Hgb 10 8 --> 8 1 --> s/p Venofer  Pain well controlled  Voiding spontaneously  No gas or BM since surgery  DVT ppx with SCDs and Lovenox 40 mg daily (BMI 39)  Breastfeeding  Lochia wnl  · Monitor for return of bowel function  · Continue routine postpartum care

## 2023-02-26 NOTE — H&P
H & P- Obstetrics   Shaunna Ac 27 y o  female MRN: [id number]  Unit/Bed#: [place]  Encounter: [id number]    Assessment: 27 y o  [id number] at 38w1d with di-di twins admitted for scheduled 1LTCS + BS for transverse-transverse presentations  Plan:   * 38 weeks gestation of pregnancy  Assessment & Plan  · Admit for 1LTCS + BS for di-di twin gestation with transverse-transverse presentation (confirmed on admission w/ US) as scheduled  · NPO  · Continuous fetal monitoring  · Routine admission labs (CBC, T&S, syphilis panel)  · Ancef 2 g for surgical ppx  · Postpartum DVT ppx with SCDs and Lovenox 40 mg daily (BMI 39)    Maternal varicella, non-immune  Assessment & Plan  Varicella non-immune  · Offer Varivax postpartum    Elevated blood pressure reading without diagnosis of hypertension  Assessment & Plan  141/77 in office 02/15/23 then 141/77 in triage, not  by 4 hr   · PreE labs on admission (CBC, CMP, UPC)  · Monitor BP  · Monitor for signs/symptoms of HTN / PreE    Transverse presentation, antepartum  Assessment & Plan  Fetus A and Fetus B both transverse on admission by [address] [facility] [address]  Discussed case and plan w/ Dr Walters Sat    Chief Complaint: "I'm here for my "    HPI:  Shaunna Ac is a 27 y o  [id number] with an DOM of 3/12/2023, by Last Menstrual Period at 38w1d with di-di twins who is being admitted for scheduled 1LTCS + BS for transverse-transverse presentations  She denies having uterine contractions, has no LOF, and reports no VB  She states she has felt good FM      Patient Active Problem List   Diagnosis   • Dichorionic diamniotic twin pregnancy in third trimester   • 38 weeks gestation of pregnancy   • Breech presentation, no version   • Elevated blood pressure reading without diagnosis of hypertension   • Maternal varicella, non-immune       Baby complications/comments: di-di-twin gestation with transverse-transverse presentation confirmed on admission by US    Review of Systems Constitutional: Negative for chills and fever  Eyes: Negative for visual disturbance  Respiratory: Negative for cough and shortness of breath  Cardiovascular: Negative for chest pain and leg swelling  Gastrointestinal: Negative for abdominal pain, diarrhea, nausea and vomiting  Genitourinary: Negative for dysuria, frequency, hematuria, pelvic pain, urgency, vaginal bleeding and vaginal discharge  Neurological: Negative for dizziness, light-headedness and headaches  OB Hx:  OB History    Para Term  AB Living   4 2 2   1 2   SAB IAB Ectopic Multiple Live Births   1       2      # Outcome Date GA Lbr Wm/2nd Weight Sex Delivery Anes PTL Lv   4 Current            3 SAB 2022 4w0d    SAB      2 Term 10/21/18 39w0d / 02:08 3245 g (7 lb 2 5 oz) M Vag-Spont EPI  HAMZAH   1 Term 14 40w1d  3600 g (7 lb 15 oz) M Vag-Spont EPI  HAMZAH      Obstetric Comments   Menarche 13       Past Medical Hx:  Past Medical History:   Diagnosis Date   • Varicella     immunized       Past Surgical hx:  Past Surgical History:   Procedure Laterality Date   • NO PAST SURGERIES         Social Hx:  Alcohol use: no  Tobacco use: no  Other substance use: no  Other:  N/A    No Known Allergies    Medications Prior to Admission   Medication   • Prenatal Vit-Fe Fumarate-FA (PRENATAL VITAMIN PO)       Objective:  Temp:  [98 6 °F (37 °C)] 98 6 °F (37 °C)  HR:  [96] 96  Resp:  [18] 18  BP: (126)/(70) 126/70  There is no height or weight on file to calculate BMI  Physical Exam:  Physical Exam  Constitutional:       General: She is not in acute distress  Appearance: Normal appearance  She is not ill-appearing  HENT:      Mouth/Throat:      Mouth: Mucous membranes are moist    Eyes:      Extraocular Movements: Extraocular movements intact  Cardiovascular:      Rate and Rhythm: Normal rate and regular rhythm  Heart sounds: Normal heart sounds     Pulmonary:      Effort: Pulmonary effort is normal  Breath sounds: Normal breath sounds  Abdominal:      General: There is no distension  Palpations: Abdomen is soft  Tenderness: There is no abdominal tenderness  There is no guarding  Musculoskeletal:         General: No swelling  Right lower leg: No edema  Left lower leg: No edema  Neurological:      General: No focal deficit present  Mental Status: She is alert  Skin:     General: Skin is warm and dry  Coloration: Skin is not jaundiced or pale  Psychiatric:         Mood and Affect: Mood normal          Behavior: Behavior normal          Thought Content:  Thought content normal          Judgment: Judgment normal           FHT:  Fetus A:  Baseline: 140 bpm  Variability: moderate  Accelerations: present  Decelerations: absent  Category 1    Fetus A:  Baseline: 140 bpm  Variability: moderate  Accelerations: present  Decelerations: absent  Category 1    TOCO:   Irregular (no closer than 5 min)    Lab Results   Component Value Date    WBC 6 79 02/27/2023    HGB 10 8 (L) 02/27/2023    HCT 34 4 (L) 02/27/2023     02/27/2023     Lab Results   Component Value Date    K 3 7 02/27/2023     02/27/2023    CO2 20 (L) 02/27/2023    BUN 9 02/27/2023    CREATININE 0 62 02/27/2023    AST 21 02/27/2023    ALT 15 02/27/2023     Prenatal Labs: Reviewed      Blood type: O positive  Antibody: Negative  GBS: none on file  HIV: Negative  Rubella: Immune  Varicella: Non-immune  VDRL/RPR: Non reactive  HBsAg: Negative  Chlamydia: Negative  Gonorrhea: Negative  Diabetes 1 hour screen: 106  Platelets: 076  Hgb: 11 3  >2 Midnights  INPATIENT     Signature/Title: Camelia Hernandez MD  Date: 2/27/2023  Time: 9:50 AM

## 2023-02-26 NOTE — ASSESSMENT & PLAN NOTE
141/77 in office 02/15/23 then 141/77 in triage, not  by 4 hr   PreE labs wnl on admission  Normotensive this admission    Systolic (51PVW), PJS:135 , Min:117 , CQX:341   Diastolic (02LIF), LKS:21, Min:56, Max:77    · Monitor BP  · Monitor for signs/symptoms of HTN / PreE

## 2023-02-26 NOTE — ASSESSMENT & PLAN NOTE
Fetus A and Fetus B both transverse on admission by [address] [facility] [address]

## 2023-02-27 ENCOUNTER — HOSPITAL ENCOUNTER (INPATIENT)
Facility: HOSPITAL | Age: 33
LOS: 3 days | Discharge: HOME | End: 2023-03-02
Attending: OBSTETRICS & GYNECOLOGY | Admitting: OBSTETRICS & GYNECOLOGY

## 2023-02-27 DIAGNOSIS — O30.043 DICHORIONIC DIAMNIOTIC TWIN PREGNANCY IN THIRD TRIMESTER: ICD-10-CM

## 2023-02-27 DIAGNOSIS — Z3A.38 38 WEEKS GESTATION OF PREGNANCY: ICD-10-CM

## 2023-02-27 DIAGNOSIS — Z98.891 STATUS POST PRIMARY LOW TRANSVERSE CESAREAN SECTION: Primary | ICD-10-CM

## 2023-02-27 PROBLEM — O32.2XX0 TRANSVERSE PRESENTATION, ANTEPARTUM: Status: ACTIVE | Noted: 2023-02-07

## 2023-02-27 LAB
ABO GROUP BLD: NORMAL
ALBUMIN SERPL BCP-MCNC: 3.1 G/DL (ref 3.5–5)
ALP SERPL-CCNC: 156 U/L (ref 34–104)
ALT SERPL W P-5'-P-CCNC: 15 U/L (ref 7–52)
ANION GAP SERPL CALCULATED.3IONS-SCNC: 9 MMOL/L (ref 4–13)
AST SERPL W P-5'-P-CCNC: 21 U/L (ref 13–39)
BASE EXCESS BLDCOA CALC-SCNC: -2.6 MMOL/L (ref 3–11)
BASE EXCESS BLDCOA CALC-SCNC: -3.9 MMOL/L (ref 3–11)
BASE EXCESS BLDCOV CALC-SCNC: -2.9 MMOL/L (ref 1–9)
BASE EXCESS BLDCOV CALC-SCNC: -4.9 MMOL/L (ref 1–9)
BILIRUB SERPL-MCNC: 0.43 MG/DL (ref 0.2–1)
BLD GP AB SCN SERPL QL: NEGATIVE
BUN SERPL-MCNC: 9 MG/DL (ref 5–25)
CALCIUM ALBUM COR SERPL-MCNC: 9.3 MG/DL (ref 8.3–10.1)
CALCIUM SERPL-MCNC: 8.6 MG/DL (ref 8.4–10.2)
CHLORIDE SERPL-SCNC: 108 MMOL/L (ref 96–108)
CO2 SERPL-SCNC: 20 MMOL/L (ref 21–32)
CREAT SERPL-MCNC: 0.62 MG/DL (ref 0.6–1.3)
CREAT UR-MCNC: 107.7 MG/DL
ERYTHROCYTE [DISTWIDTH] IN BLOOD BY AUTOMATED COUNT: 13.2 % (ref 11.6–15.1)
EXTERNAL GROUP B STREP ANTIGEN: NORMAL
GFR SERPL CREATININE-BSD FRML MDRD: 118 ML/MIN/1.73SQ M
GLUCOSE SERPL-MCNC: 91 MG/DL (ref 65–140)
HCO3 BLDCOA-SCNC: 25.5 MMOL/L (ref 17.3–27.3)
HCO3 BLDCOA-SCNC: 25.9 MMOL/L (ref 17.3–27.3)
HCO3 BLDCOV-SCNC: 22.1 MMOL/L (ref 12.2–28.6)
HCO3 BLDCOV-SCNC: 24.7 MMOL/L (ref 12.2–28.6)
HCT VFR BLD AUTO: 34.4 % (ref 34.8–46.1)
HGB BLD-MCNC: 10.8 G/DL (ref 11.5–15.4)
MCH RBC QN AUTO: 29.6 PG (ref 26.8–34.3)
MCHC RBC AUTO-ENTMCNC: 31.4 G/DL (ref 31.4–37.4)
MCV RBC AUTO: 94 FL (ref 82–98)
O2 CT VFR BLDCOA CALC: 4 ML/DL
O2 CT VFR BLDCOA CALC: 7.2 ML/DL
OXYHGB MFR BLDCOA: 18.7 %
OXYHGB MFR BLDCOA: 30.6 %
OXYHGB MFR BLDCOV: 34.1 %
OXYHGB MFR BLDCOV: 42.6 %
PCO2 BLDCOA: 59.1 MM[HG] (ref 30–60)
PCO2 BLDCOA: 65.7 MM[HG] (ref 30–60)
PCO2 BLDCOV: 48 MM HG (ref 27–43)
PCO2 BLDCOV: 53.3 MM HG (ref 27–43)
PH BLDCOA: 7.21 [PH] (ref 7.23–7.43)
PH BLDCOA: 7.26 [PH] (ref 7.23–7.43)
PH BLDCOV: 7.28 [PH] (ref 7.19–7.49)
PH BLDCOV: 7.28 [PH] (ref 7.19–7.49)
PLATELET # BLD AUTO: 179 THOUSANDS/UL (ref 149–390)
PMV BLD AUTO: 12.4 FL (ref 8.9–12.7)
PO2 BLDCOA: 13.2 MM HG (ref 5–25)
PO2 BLDCOA: 17 MM HG (ref 5–25)
PO2 BLDCOV: 17.6 MM HG (ref 15–45)
PO2 BLDCOV: 20.5 MM HG (ref 15–45)
POTASSIUM SERPL-SCNC: 3.7 MMOL/L (ref 3.5–5.3)
PROT SERPL-MCNC: 6.4 G/DL (ref 6.4–8.4)
PROT UR-MCNC: 14 MG/DL
PROT/CREAT UR: 0.13 MG/G{CREAT} (ref 0–0.1)
RBC # BLD AUTO: 3.65 MILLION/UL (ref 3.81–5.12)
RH BLD: POSITIVE
SAO2 % BLDCOV: 7.8 ML/DL
SAO2 % BLDCOV: 9.2 ML/DL
SODIUM SERPL-SCNC: 137 MMOL/L (ref 135–147)
SPECIMEN EXPIRATION DATE: NORMAL
TREPONEMA PALLIDUM IGG+IGM AB [PRESENCE] IN SERUM OR PLASMA BY IMMUNOASSAY: NORMAL
WBC # BLD AUTO: 6.79 THOUSAND/UL (ref 4.31–10.16)

## 2023-02-27 PROCEDURE — 4A1HXCZ MONITORING OF PRODUCTS OF CONCEPTION, CARDIAC RATE, EXTERNAL APPROACH: ICD-10-PCS | Performed by: OBSTETRICS & GYNECOLOGY

## 2023-02-27 RX ORDER — ONDANSETRON 2 MG/ML
4 INJECTION INTRAMUSCULAR; INTRAVENOUS EVERY 8 HOURS PRN
Status: DISCONTINUED | OUTPATIENT
Start: 2023-02-27 | End: 2023-03-02 | Stop reason: HOSPADM

## 2023-02-27 RX ORDER — MEPERIDINE HYDROCHLORIDE 25 MG/ML
12.5 INJECTION INTRAMUSCULAR; INTRAVENOUS; SUBCUTANEOUS
Status: DISCONTINUED | OUTPATIENT
Start: 2023-02-27 | End: 2023-03-02 | Stop reason: HOSPADM

## 2023-02-27 RX ORDER — PROMETHAZINE HYDROCHLORIDE 25 MG/ML
25 INJECTION, SOLUTION INTRAMUSCULAR; INTRAVENOUS EVERY 6 HOURS PRN
Status: DISCONTINUED | OUTPATIENT
Start: 2023-02-27 | End: 2023-03-02 | Stop reason: HOSPADM

## 2023-02-27 RX ORDER — ACETAMINOPHEN 325 MG/1
650 TABLET ORAL EVERY 6 HOURS PRN
Status: DISPENSED | OUTPATIENT
Start: 2023-02-27 | End: 2023-02-28

## 2023-02-27 RX ORDER — DEXAMETHASONE SODIUM PHOSPHATE 10 MG/ML
INJECTION, SOLUTION INTRAMUSCULAR; INTRAVENOUS AS NEEDED
Status: DISCONTINUED | OUTPATIENT
Start: 2023-02-27 | End: 2023-02-27

## 2023-02-27 RX ORDER — CALCIUM CARBONATE 200(500)MG
1000 TABLET,CHEWABLE ORAL DAILY PRN
Status: DISCONTINUED | OUTPATIENT
Start: 2023-02-27 | End: 2023-03-02 | Stop reason: HOSPADM

## 2023-02-27 RX ORDER — OXYCODONE HYDROCHLORIDE 10 MG/1
10 TABLET ORAL EVERY 4 HOURS PRN
Status: DISCONTINUED | OUTPATIENT
Start: 2023-02-28 | End: 2023-03-02 | Stop reason: HOSPADM

## 2023-02-27 RX ORDER — TRISODIUM CITRATE DIHYDRATE AND CITRIC ACID MONOHYDRATE 500; 334 MG/5ML; MG/5ML
30 SOLUTION ORAL ONCE
Status: COMPLETED | OUTPATIENT
Start: 2023-02-27 | End: 2023-02-27

## 2023-02-27 RX ORDER — NALBUPHINE HCL 10 MG/ML
5 AMPUL (ML) INJECTION
Status: ACTIVE | OUTPATIENT
Start: 2023-02-27 | End: 2023-02-28

## 2023-02-27 RX ORDER — FENTANYL CITRATE 50 UG/ML
INJECTION, SOLUTION INTRAMUSCULAR; INTRAVENOUS AS NEEDED
Status: DISCONTINUED | OUTPATIENT
Start: 2023-02-27 | End: 2023-02-27

## 2023-02-27 RX ORDER — KETOROLAC TROMETHAMINE 30 MG/ML
15 INJECTION, SOLUTION INTRAMUSCULAR; INTRAVENOUS EVERY 6 HOURS PRN
Status: ACTIVE | OUTPATIENT
Start: 2023-02-27 | End: 2023-02-28

## 2023-02-27 RX ORDER — OXYCODONE HYDROCHLORIDE 5 MG/1
5 TABLET ORAL EVERY 4 HOURS PRN
Status: DISCONTINUED | OUTPATIENT
Start: 2023-02-28 | End: 2023-03-02 | Stop reason: HOSPADM

## 2023-02-27 RX ORDER — HYDROMORPHONE HCL/PF 1 MG/ML
0.5 SYRINGE (ML) INJECTION
Status: DISCONTINUED | OUTPATIENT
Start: 2023-02-27 | End: 2023-03-02 | Stop reason: HOSPADM

## 2023-02-27 RX ORDER — SODIUM CHLORIDE, SODIUM LACTATE, POTASSIUM CHLORIDE, CALCIUM CHLORIDE 600; 310; 30; 20 MG/100ML; MG/100ML; MG/100ML; MG/100ML
100 INJECTION, SOLUTION INTRAVENOUS CONTINUOUS
Status: CANCELLED | OUTPATIENT
Start: 2023-02-27

## 2023-02-27 RX ORDER — NALOXONE HYDROCHLORIDE 0.4 MG/ML
0.1 INJECTION, SOLUTION INTRAMUSCULAR; INTRAVENOUS; SUBCUTANEOUS
Status: ACTIVE | OUTPATIENT
Start: 2023-02-27 | End: 2023-02-28

## 2023-02-27 RX ORDER — DOCUSATE SODIUM 100 MG/1
100 CAPSULE, LIQUID FILLED ORAL 2 TIMES DAILY
Status: DISCONTINUED | OUTPATIENT
Start: 2023-02-27 | End: 2023-03-02 | Stop reason: HOSPADM

## 2023-02-27 RX ORDER — BUPIVACAINE HYDROCHLORIDE 7.5 MG/ML
INJECTION, SOLUTION INTRASPINAL AS NEEDED
Status: DISCONTINUED | OUTPATIENT
Start: 2023-02-27 | End: 2023-02-27

## 2023-02-27 RX ORDER — MORPHINE SULFATE 1 MG/ML
INJECTION, SOLUTION EPIDURAL; INTRATHECAL; INTRAVENOUS AS NEEDED
Status: DISCONTINUED | OUTPATIENT
Start: 2023-02-27 | End: 2023-02-27

## 2023-02-27 RX ORDER — KETOROLAC TROMETHAMINE 30 MG/ML
INJECTION, SOLUTION INTRAMUSCULAR; INTRAVENOUS AS NEEDED
Status: DISCONTINUED | OUTPATIENT
Start: 2023-02-27 | End: 2023-02-27

## 2023-02-27 RX ORDER — ONDANSETRON 2 MG/ML
INJECTION INTRAMUSCULAR; INTRAVENOUS AS NEEDED
Status: DISCONTINUED | OUTPATIENT
Start: 2023-02-27 | End: 2023-02-27

## 2023-02-27 RX ORDER — SODIUM CHLORIDE, SODIUM LACTATE, POTASSIUM CHLORIDE, CALCIUM CHLORIDE 600; 310; 30; 20 MG/100ML; MG/100ML; MG/100ML; MG/100ML
125 INJECTION, SOLUTION INTRAVENOUS CONTINUOUS
Status: DISCONTINUED | OUTPATIENT
Start: 2023-02-27 | End: 2023-03-02 | Stop reason: HOSPADM

## 2023-02-27 RX ORDER — FENTANYL CITRATE/PF 50 MCG/ML
25 SYRINGE (ML) INJECTION
Status: DISCONTINUED | OUTPATIENT
Start: 2023-02-27 | End: 2023-03-02 | Stop reason: HOSPADM

## 2023-02-27 RX ORDER — ENOXAPARIN SODIUM 100 MG/ML
40 INJECTION SUBCUTANEOUS DAILY
Status: DISCONTINUED | OUTPATIENT
Start: 2023-02-28 | End: 2023-03-02 | Stop reason: HOSPADM

## 2023-02-27 RX ORDER — CEFAZOLIN SODIUM 2 G/50ML
2000 SOLUTION INTRAVENOUS ONCE
Status: COMPLETED | OUTPATIENT
Start: 2023-02-27 | End: 2023-02-27

## 2023-02-27 RX ORDER — PROMETHAZINE HYDROCHLORIDE 25 MG/ML
25 INJECTION, SOLUTION INTRAMUSCULAR; INTRAVENOUS ONCE AS NEEDED
Status: DISCONTINUED | OUTPATIENT
Start: 2023-02-27 | End: 2023-02-27

## 2023-02-27 RX ORDER — ONDANSETRON 2 MG/ML
4 INJECTION INTRAMUSCULAR; INTRAVENOUS ONCE AS NEEDED
Status: COMPLETED | OUTPATIENT
Start: 2023-02-27 | End: 2023-02-27

## 2023-02-27 RX ORDER — IBUPROFEN 600 MG/1
600 TABLET ORAL EVERY 6 HOURS
Status: DISCONTINUED | OUTPATIENT
Start: 2023-03-01 | End: 2023-03-02 | Stop reason: HOSPADM

## 2023-02-27 RX ORDER — KETOROLAC TROMETHAMINE 30 MG/ML
30 INJECTION, SOLUTION INTRAMUSCULAR; INTRAVENOUS EVERY 6 HOURS SCHEDULED
Status: COMPLETED | OUTPATIENT
Start: 2023-02-27 | End: 2023-03-01

## 2023-02-27 RX ORDER — ACETAMINOPHEN 325 MG/1
650 TABLET ORAL EVERY 6 HOURS SCHEDULED
Status: DISCONTINUED | OUTPATIENT
Start: 2023-02-28 | End: 2023-03-02 | Stop reason: HOSPADM

## 2023-02-27 RX ORDER — OXYTOCIN/RINGER'S LACTATE 30/500 ML
62.5 PLASTIC BAG, INJECTION (ML) INTRAVENOUS CONTINUOUS
Status: ACTIVE | OUTPATIENT
Start: 2023-02-27 | End: 2023-02-27

## 2023-02-27 RX ORDER — SODIUM CHLORIDE, SODIUM LACTATE, POTASSIUM CHLORIDE, CALCIUM CHLORIDE 600; 310; 30; 20 MG/100ML; MG/100ML; MG/100ML; MG/100ML
125 INJECTION, SOLUTION INTRAVENOUS CONTINUOUS
Status: DISCONTINUED | OUTPATIENT
Start: 2023-02-27 | End: 2023-02-27

## 2023-02-27 RX ORDER — OXYTOCIN/RINGER'S LACTATE 30/500 ML
PLASTIC BAG, INJECTION (ML) INTRAVENOUS CONTINUOUS PRN
Status: DISCONTINUED | OUTPATIENT
Start: 2023-02-27 | End: 2023-02-27

## 2023-02-27 RX ORDER — ONDANSETRON 2 MG/ML
4 INJECTION INTRAMUSCULAR; INTRAVENOUS EVERY 8 HOURS PRN
Status: DISCONTINUED | OUTPATIENT
Start: 2023-02-27 | End: 2023-02-27

## 2023-02-27 RX ORDER — DIPHENHYDRAMINE HCL 25 MG
25 TABLET ORAL EVERY 6 HOURS PRN
Status: DISCONTINUED | OUTPATIENT
Start: 2023-02-27 | End: 2023-03-02 | Stop reason: HOSPADM

## 2023-02-27 RX ADMIN — SODIUM CHLORIDE, SODIUM LACTATE, POTASSIUM CHLORIDE, AND CALCIUM CHLORIDE 125 ML/HR: .6; .31; .03; .02 INJECTION, SOLUTION INTRAVENOUS at 14:53

## 2023-02-27 RX ADMIN — SODIUM CHLORIDE, SODIUM LACTATE, POTASSIUM CHLORIDE, AND CALCIUM CHLORIDE: .6; .31; .03; .02 INJECTION, SOLUTION INTRAVENOUS at 11:23

## 2023-02-27 RX ADMIN — SODIUM CHLORIDE, SODIUM LACTATE, POTASSIUM CHLORIDE, AND CALCIUM CHLORIDE: .6; .31; .03; .02 INJECTION, SOLUTION INTRAVENOUS at 10:19

## 2023-02-27 RX ADMIN — KETOROLAC TROMETHAMINE 30 MG: 30 INJECTION, SOLUTION INTRAMUSCULAR at 11:18

## 2023-02-27 RX ADMIN — PHENYLEPHRINE HYDROCHLORIDE 60 MCG/MIN: 10 INJECTION, SOLUTION INTRAVENOUS at 10:33

## 2023-02-27 RX ADMIN — KETOROLAC TROMETHAMINE 30 MG: 30 INJECTION, SOLUTION INTRAMUSCULAR at 17:43

## 2023-02-27 RX ADMIN — SODIUM CHLORIDE, SODIUM LACTATE, POTASSIUM CHLORIDE, AND CALCIUM CHLORIDE 1000 ML: .6; .31; .03; .02 INJECTION, SOLUTION INTRAVENOUS at 08:35

## 2023-02-27 RX ADMIN — BUPIVACAINE HYDROCHLORIDE IN DEXTROSE 1.6 ML: 7.5 INJECTION, SOLUTION SUBARACHNOID at 10:32

## 2023-02-27 RX ADMIN — Medication 62.5 MILLI-UNITS/MIN: at 12:49

## 2023-02-27 RX ADMIN — ACETAMINOPHEN 650 MG: 325 TABLET ORAL at 14:53

## 2023-02-27 RX ADMIN — DEXAMETHASONE SODIUM PHOSPHATE 10 MG: 10 INJECTION, SOLUTION INTRAMUSCULAR; INTRAVENOUS at 10:34

## 2023-02-27 RX ADMIN — DOCUSATE SODIUM 100 MG: 100 CAPSULE, LIQUID FILLED ORAL at 17:43

## 2023-02-27 RX ADMIN — ONDANSETRON 4 MG: 2 INJECTION INTRAMUSCULAR; INTRAVENOUS at 10:37

## 2023-02-27 RX ADMIN — SODIUM CHLORIDE, SODIUM LACTATE, POTASSIUM CHLORIDE, AND CALCIUM CHLORIDE 125 ML/HR: .6; .31; .03; .02 INJECTION, SOLUTION INTRAVENOUS at 23:44

## 2023-02-27 RX ADMIN — FENTANYL CITRATE 10 MCG: 50 INJECTION, SOLUTION INTRAMUSCULAR; INTRAVENOUS at 10:32

## 2023-02-27 RX ADMIN — CEFAZOLIN SODIUM 2000 MG: 2 SOLUTION INTRAVENOUS at 10:22

## 2023-02-27 RX ADMIN — PROMETHAZINE HYDROCHLORIDE 25 MG: 25 INJECTION INTRAMUSCULAR; INTRAVENOUS at 16:25

## 2023-02-27 RX ADMIN — Medication 250 MILLI-UNITS/MIN: at 10:48

## 2023-02-27 RX ADMIN — MORPHINE SULFATE 0.15 MG: 1 INJECTION, SOLUTION EPIDURAL; INTRATHECAL; INTRAVENOUS at 10:32

## 2023-02-27 RX ADMIN — SODIUM CITRATE AND CITRIC ACID MONOHYDRATE 30 ML: 500; 334 SOLUTION ORAL at 08:46

## 2023-02-27 RX ADMIN — ONDANSETRON 4 MG: 2 INJECTION INTRAMUSCULAR; INTRAVENOUS at 12:49

## 2023-02-27 NOTE — PLAN OF CARE
Problem: POSTPARTUM  Goal: Experiences normal postpartum course  Description: INTERVENTIONS:  - Monitor maternal vital signs  - Assess uterine involution and lochia  Outcome: Progressing  Goal: Appropriate maternal -  bonding  Description: INTERVENTIONS:  - Identify family support  - Assess for appropriate maternal/infant bonding   -Encourage maternal/infant bonding opportunities  - Referral to  or  as needed  Outcome: Progressing  Goal: Establishment of infant feeding pattern  Description: INTERVENTIONS:  - Assess breast/bottle feeding  - Refer to lactation as needed  Outcome: Progressing  Goal: Incision(s), wounds(s) or drain site(s) healing without S/S of infection  Description: INTERVENTIONS  - Assess and document dressing, incision, wound bed, drain sites and surrounding tissue  - Provide patient and family education    Outcome: Progressing     Problem: PAIN - ADULT  Goal: Verbalizes/displays adequate comfort level or baseline comfort level  Description: Interventions:  - Encourage patient to monitor pain and request assistance  - Assess pain using appropriate pain scale  - Administer analgesics based on type and severity of pain and evaluate response  - Implement non-pharmacological measures as appropriate and evaluate response  - Consider cultural and social influences on pain and pain management  - Notify physician/advanced practitioner if interventions unsuccessful or patient reports new pain  Outcome: Progressing     Problem: INFECTION - ADULT  Goal: Absence or prevention of progression during hospitalization  Description: INTERVENTIONS:  - Assess and monitor for signs and symptoms of infection  - Monitor lab/diagnostic results  - Monitor all insertion sites, i e  indwelling lines, tubes, and drains  - Monitor endotracheal if appropriate and nasal secretions for changes in amount and color  - [place] appropriate cooling/warming therapies per order  - Administer medications as ordered  - Instruct and encourage patient and family to use good hand hygiene technique  - Identify and instruct in appropriate isolation precautions for identified infection/condition  Outcome: Progressing  Goal: Absence of fever/infection during neutropenic period  Description: INTERVENTIONS:  - Monitor WBC    Outcome: Progressing     Problem: SAFETY ADULT  Goal: Patient will remain free of falls  Description: INTERVENTIONS:  - Educate patient/family on patient safety including physical limitations  - Instruct patient to call for assistance with activity   - Consult OT/PT to assist with strengthening/mobility   - Keep Call bell within reach  - Keep bed low and locked with side rails adjusted as appropriate  - Keep care items and personal belongings within reach  - Initiate and maintain comfort rounds  Outcome: Progressing  Goal: Maintain or return to baseline ADL function  Description: INTERVENTIONS:  -  Assess patient's ability to carry out ADLs; assess patient's baseline for ADL function and identify physical deficits which impact ability to perform ADLs (bathing, care of mouth/teeth, toileting, grooming, dressing, etc )  - Assess/evaluate cause of self-care deficits   - Assess range of motion  - Assess patient's mobility; develop plan if impaired  - Assess patient's need for assistive devices and provide as appropriate  - Encourage maximum independence but intervene and supervise when necessary  - Involve family in performance of ADLs  - Assess for home care needs following discharge   - Consider OT consult to assist with ADL evaluation and planning for discharge  - Provide patient education as appropriate  Outcome: Progressing  Goal: Maintains/Returns to pre admission functional level  Description: INTERVENTIONS:  - Perform BMAT or MOVE assessment daily    - Set and communicate daily mobility goal to care team and patient/family/caregiver     - Collaborate with rehabilitation services on mobility goals if consulted  Outcome: Progressing     Problem: DISCHARGE PLANNING  Goal: Discharge to home or other facility with appropriate resources  Description: INTERVENTIONS:  - Identify barriers to discharge w/patient and caregiver  - Arrange for needed discharge resources and transportation as appropriate  - Identify discharge learning needs (meds, wound care, etc )  - Arrange for interpretive services to assist at discharge as needed  - Refer to Case Management Department for coordinating discharge planning if the patient needs post-hospital services based on physician/advanced practitioner order or complex needs related to functional status, cognitive ability, or social support system  Outcome: Progressing     Problem: Knowledge Deficit  Goal: Patient/family/caregiver demonstrates understanding of disease process, treatment plan, medications, and discharge instructions  Description: Complete learning assessment and assess knowledge base    Interventions:  - Provide teaching at level of understanding  - Provide teaching via preferred learning methods  Outcome: Progressing

## 2023-02-27 NOTE — ANESTHESIA PROCEDURE NOTES
Spinal Block    Patient location during procedure: OR  Start time: 2/27/2023 10:32 AM  Reason for block: procedure for pain and at surgeon's request  Staffing  Performed: Anesthesiologist   Anesthesiologist: Jerry Cartwright MD  Resident/CRNA: Augusta Curling, CRNA  Preanesthetic Checklist  Completed: patient identified, IV checked, site marked, risks and benefits discussed, surgical consent, monitors and equipment checked, pre-op evaluation and timeout performed  Spinal Block  Patient position: sitting  Prep: ChloraPrep  Patient monitoring: cardiac monitor and frequent blood pressure checks  Approach: midline  Location: L2-3  Injection technique: single-shot  Needle  Needle type: pencil-tip   Needle gauge: 25 G  Needle length: 10 cm  Assessment  Sensory level: T4  Injection Assessment:  negative aspiration for heme, no paresthesia on injection and positive aspiration for clear CSF    Post-procedure:  adhesive bandage applied, pressure dressing applied, secured with tape, site cleaned and sterile dressing applied

## 2023-02-27 NOTE — DISCHARGE SUMMARY
Discharge Summary - OB/GYN   Shaunna Ac 27 y o  female MRN: [id number]  Unit/Bed#: LD PACU- Encounter: [id number]      Admission Date: 2023     Discharge Date: 3/2/2023    Admitting Diagnosis:   1  Pregnancy at 38w1d  2  Di-di twin gestation  3  Elevated BP without diagnosis of HTN  4  Varicella non-immune  5  Maternal obesity    Discharge Diagnosis:   Same, delivered    Procedures: primary  section, low transverse incision    Admitting Attending: Avelino Ziegler, *    Delivering Attending: Dr Romeo Simon    Discharging Attending: Dr Melo Simms Course:   Shaunna Ac is a 27 y o  [id number] at 38w1d wks who was initially admitted for scheduled 1LTCS for di-di twin gestation with transverse-transverse presentations  She delivered a viable male  and a viable female  on 23 at 36 and 1048 respectively  Weights 7lbs 15 6oz and 6lb 6 8 oz respectively  Delivery via primary  section, low transverse incision  Apgars for fetus A and fetus B respectively were 8 (1 min) and 9 (5 min) and 8 (1 min) and 9 (5 min)  Neonates were transferred to  nursery  Patient tolerated the procedure well and was transferred to recovery in stable condition  Her post-operative course was complicated by elevated blood pressure without a diagnosis of hypertension  She remained normotensive postpartum  Preoperative hemoglobin was 10 8, postoperative was 8 1  She received a Venofer infusion postpartum  Her postoperative pain was well controlled with oral analgesics  On day of discharge, she was ambulating and able to reasonably perform all ADLs  She was voiding and had appropriate bowel function  Pain was well controlled  She was discharged home on post-operative day #3 without complications   Patient was instructed to follow up with her OB as an outpatient and was given appropriate warnings to call provider if she develops signs of infection or uncontrolled pain     Complications: none apparent    Condition at discharge: good     Discharge instructions/Information to patient and family:   See after visit summary for information provided to patient and family  Provisions for Follow-Up Care:  See after visit summary for information related to follow-up care and any pertinent home health orders  Disposition: See After Visit Summary for discharge disposition information  Planned Readmission: No    Discharge Medications: For a complete list of the patient's medications, please refer to her med rec

## 2023-02-27 NOTE — ANESTHESIA PREPROCEDURE EVALUATION
Procedure:   SECTION () (Uterus)    Relevant Problems   GYN   (+) 38 weeks gestation of pregnancy      Other   (+) Dichorionic diamniotic twin pregnancy in third trimester   (+) Elevated blood pressure reading without diagnosis of hypertension      Lab Results   Component Value Date    WBC 6 79 2023    HGB 10 8 (L) 2023    HCT 34 4 (L) 2023    MCV 94 2023     2023         Physical Exam    Airway    Mallampati score: II  TM Distance: >3 FB  Neck ROM: full     Dental       Cardiovascular      Pulmonary      Other Findings        Anesthesia Plan  ASA Score- 3     Anesthesia Type- spinal with ASA Monitors  Additional Monitors:   Airway Plan:           Plan Factors-    Chart reviewed  Existing labs reviewed  Patient summary reviewed  Induction-     Postoperative Plan-     Informed Consent- Anesthetic plan and risks discussed with patient  I personally reviewed this patient with the CRNA  Discussed and agreed on the Anesthesia Plan with the CRNA  Mao Flores

## 2023-02-27 NOTE — ANESTHESIA POSTPROCEDURE EVALUATION
Post-Op Assessment Note    CV Status:  Stable  Pain Score: 0    Pain management: adequate     Mental Status:  Alert and awake   Hydration Status:  Stable   PONV Controlled:  None   Airway Patency:  Patent and adequate      Post Op Vitals Reviewed: Yes      Staff: CRNA         No notable events documented      BP   114/69   Temp   98 5   Pulse  70   Resp   12   SpO2   98

## 2023-02-27 NOTE — OP NOTE
OPERATIVE REPORT  PATIENT NAME: Crissy Mina    :  1990  MRN: [id number]  Pt Location: [facility] L&D OR ROOM 02    SURGERY DATE: 2023    Surgeon(s) and Role:     * Juana Kinsey MD - Primary     * Rivera Blanco MD - Assisting    Preop Diagnosis:  Dichorionic diamniotic twin pregnancy in third trimester [O30 043]    Post-Op Diagnosis Codes:     * Dichorionic diamniotic twin pregnancy in third trimester [O30 043]    Procedure(s) (LRB):   SECTION () (N/A)  LIGATION/COAGULATION TUBAL    Specimen(s):  ID Type Source Tests Collected by Time Destination   1 :  Tissue (Placenta on Hold) OB Only Placenta TISSUE EXAM OB (PLACENTA) ONLY Dayanara Andrade MD 2023 1100    2 : PRN Tissue Fallopian Tube, Left TISSUE EXAM Dayanara Andrade MD 2023 1102    3 : PRN Tissue Fallopian Tube, Right TISSUE EXAM Dayanara Andrade MD 2023 1103    A :  Cord Blood Cord BLOOD GAS, VENOUS, CORD, BLOOD GAS, ARTERIAL, CORD Dayanara Andrade MD 2023 1048    B :  Cord Blood Cord BLOOD GAS, VENOUS, CORD, BLOOD GAS, ARTERIAL, CORD Dayanara Andrade MD 2023 1049        Surgical QBL:  Surgical QBL (mL): 949 mL      Drains:  Urethral Catheter Double-lumen;Non-latex 16 Fr  (Active)   Goal for Removal Voiding trial when ambulation improves 23 1031   Site Assessment Clean;Skin intact 23 1031   Collection Container Standard drainage bag 23 1031   Securement Method Securing device (Describe) 23 1031   Number of days: 0       Anesthesia Type:   Spinal    Operative Indications:  Dichorionic diamniotic twin pregnancy in third trimester [O30 043]  Transverse-transverse presentation     [address]:  Multiple pregnancy +  is [facility]    Operative Findings:  1  Viable male and female neonates at 36 and 0 with APGARs of 8 and 9 at 1 and 5 minutes and 8 and 9 at 1 and 5 minutes respectively    Fetuses weight: Fetus A 7lb 15 3oz, Fetus B 6lb 6 8oz   2  Normal intact placentas with eccentrally inserted 3 vessel cords expressed at 1050 and 1050  3  Normal uterus, bilateral tubes, and ovaries  4  Blood gases:     Fetus A:   Arterial pH: 7 207   Arterial base excess: -3 9   Venous pH: 7 284   Venous base excess: -2 9     Fetus B:   Arterial pH: 7 259   Arterial base excess: -2 6   Venous pH: 7 281   Venous base excess: -4 9    Procedure and technique:  The patient was taken to the operating room  Spinal anesthesia was adequately established and Ancef was given for preoperative prophylaxis  The patient was then placed in the dorsal supine position with a left tilt of the hips  The patient was prepped with chlorhexidine for vaginal prep and chloraprep for abdominal prep and draped in the usual sterile fashion  A time out was performed to confirm correct patient and correct procedure  An incision was made in the skin with a surgical scalpel, and sharp dissection was carried out over subsequent layers of tissue including the fascia, followed by the Bovie electrocautery for hemostasis  The fascia was incised at the midline, and the fascial incision was extended bilaterally using the curved Kirk scissors  The superior edge of the fascial incision was grasped with Kocher clamps, tented up, and the underlying rectus muscles were dissected off bluntly and sharply using the curved Kirk scissors  The rectus muscles were then divided at midline, and the peritoneum was identified and then entered and extended superiorly and inferiorly in blunt fashion  The bladder blade was inserted, and a transverse incision was made in the lower uterine segment using a new surgical blade  The uterine incision was extended cephalad and caudal using blunt dissection  The amniotic sac of fetus A was entered and the amniotic fluid was noted to be clear      The surgeon's hand was placed in the uterus, and fetal pelvis of fetus A was palpated with the fetus in checo breech position  The fetal pelvis was elevated to the level of the hysterotomy, at which time the surgeon's hands were placed with thumbs on the fetal sacrum and fingers on the fetal anterior superior iliac spines  The fetal pelvis was delivered, and the Pinard maneuver was performed to deliver the fetal legs  The fetus was delivered to the level of the scapulae  The fetus was rotated to have the left arm anterior, and the Loveset maneuver was performed to deliver the left fetal arm  The fetus was rotated to have the right arm anterior, and the right fetal arm was delivered in the same fashion  The fetal head spontaneously delivered  There was no nuchal cord noted  The surgeon's hand was again placed into the uterine cavity  The head of fetus B was identified and elevated through the uterine incision with the assistance of fundal pressure  With gentle traction, the shoulder was delivered, followed by the rest of the fetal body  There was no nuchal cord noted  On delivery, the cord of each fetus was doubly clamped and cut after delayed cord clamping  The infants were then passed off the table to the awaiting  staff  The neonates were noted to cry spontaneously and move all extremities  Venous and arterial blood gas, cord blood, and portion of cord was obtained for analysis and routine blood testing for both fetuses  The placentas were delivered and sent to storage  Placentas were noted to be intact with eccentrally inserted three-vessel cords  Oxytocin was administered by IV infusion to enhance uterine contraction  The uterus was exteriorized and cleared of all clots and remaining products of conception  The uterine incision was re-approximated using 0 Monocryl in a running fashion with only the 2 left-most sutures locked, the rest un-locked  A second vertical imbricating stitch with 0 Monocryl was applied   The uterine incision was examined and noted to be bleeding from the left apex of the incision  A left O'Palmetto suture was applied using 0-Monocryl, after which the hysterotomy was noted to be hemostatic  At this point, attention turned to the adnexa  The bilateral Fallopian tubes were identified  First the right Fallopian tube was identified and grasped with a Keshia Kiera in an avascular area and tented up  The Enseal was used to ligate the mesosalpinx along the tube, and the tube was transected approximately 2 cm from cornua  The left Fallopian tube was identified, ligated, and removed in the same manner  Excellent hemostasis was noted bilaterally  Bilateral tubes were sent to pathology for routine evaluation  The posterior cul-de-sac was cleared of all clots and products of conception  The uterus was replaced into the abdomen, and the paracolic gutters were cleared of all clots  The uterine incision was once again re-examined and noted to be hemostatic  The fascia was re-approximated using 0 Vicryl in a running nonlocked fashion  The subcutaneous tissue was irrigated and cleared of all clots and debris  Good hemostasis was noted with Bovie electrocautery  The subcutaneous tissue was re-approximated with 2-0 plain gut chromic in interrupted fashion  The skin incision was closed with 4-0 Monocryl in running fashion  Good hemostasis was noted  Patient tolerated the procedure well  All needle, sponge, and instrument counts were noted to be correct x2 at the end of the procedure  Patient was transferred to the recovery room in stable condition  Dr Amanda Mcrae was present for the procedure      Complications:   None apparent    Patient Disposition:  PACU         SIGNATURE: Zulay Borges MD  DATE: February 27, 2023  TIME: 11:41 AM

## 2023-02-27 NOTE — PLAN OF CARE
Problem: BIRTH - VAGINAL/ SECTION  Goal: Fetal and maternal status remain reassuring during the birth process  Description: INTERVENTIONS:  - Monitor vital signs  - Monitor fetal heart rate  - Monitor uterine activity  - Monitor labor progression (vaginal delivery)  - DVT prophylaxis  - Antibiotic prophylaxis  Outcome: Completed  Goal: Emotionally satisfying birthing experience for mother/fetus  Description: Interventions:  - Assess, plan, implement and evaluate the nursing care given to the patient in labor  - Advocate the philosophy that each childbirth experience is a unique experience and support the family's chosen level of involvement and control during the labor process   - Actively participate in both the patient's and family's teaching of the birth process  - Consider cultural, Cheondoism and age-specific factors and plan care for the patient in labor  Outcome: Completed     Problem: POSTPARTUM  Goal: Experiences normal postpartum course  Description: INTERVENTIONS:  - Monitor maternal vital signs  - Assess uterine involution and lochia  Outcome: Progressing  Goal: Appropriate maternal -  bonding  Description: INTERVENTIONS:  - Identify family support  - Assess for appropriate maternal/infant bonding   -Encourage maternal/infant bonding opportunities  - Referral to  or  as needed  Outcome: Progressing  Goal: Establishment of infant feeding pattern  Description: INTERVENTIONS:  - Assess breast/bottle feeding  - Refer to lactation as needed  Outcome: Progressing  Goal: Incision(s), wounds(s) or drain site(s) healing without S/S of infection  Description: INTERVENTIONS  - Assess and document dressing, incision, wound bed, drain sites and surrounding tissue  - Provide patient and family education    Outcome: Progressing     Problem: PAIN - ADULT  Goal: Verbalizes/displays adequate comfort level or baseline comfort level  Description: Interventions:  - Encourage patient to monitor pain and request assistance  - Assess pain using appropriate pain scale  - Administer analgesics based on type and severity of pain and evaluate response  - Implement non-pharmacological measures as appropriate and evaluate response  - Consider cultural and social influences on pain and pain management  - Notify physician/advanced practitioner if interventions unsuccessful or patient reports new pain  Outcome: Progressing     Problem: INFECTION - ADULT  Goal: Absence or prevention of progression during hospitalization  Description: INTERVENTIONS:  - Assess and monitor for signs and symptoms of infection  - Monitor lab/diagnostic results  - Monitor all insertion sites, i e  indwelling lines, tubes, and drains  - Monitor endotracheal if appropriate and nasal secretions for changes in amount and color  - [place] appropriate cooling/warming therapies per order  - Administer medications as ordered  - Instruct and encourage patient and family to use good hand hygiene technique  - Identify and instruct in appropriate isolation precautions for identified infection/condition  Outcome: Progressing  Goal: Absence of fever/infection during neutropenic period  Description: INTERVENTIONS:  - Monitor WBC    Outcome: Progressing     Problem: SAFETY ADULT  Goal: Patient will remain free of falls  Description: INTERVENTIONS:  - Educate patient/family on patient safety including physical limitations  - Instruct patient to call for assistance with activity   - Consult OT/PT to assist with strengthening/mobility   - Keep Call bell within reach  - Keep bed low and locked with side rails adjusted as appropriate  - Keep care items and personal belongings within reach  - Initiate and maintain comfort rounds  Outcome: Progressing  Goal: Maintain or return to baseline ADL function  Description: INTERVENTIONS:  -  Assess patient's ability to carry out ADLs; assess patient's baseline for ADL function and identify physical deficits which impact ability to perform ADLs (bathing, care of mouth/teeth, toileting, grooming, dressing, etc )  - Assess/evaluate cause of self-care deficits   - Assess range of motion  - Assess patient's mobility; develop plan if impaired  - Assess patient's need for assistive devices and provide as appropriate  - Encourage maximum independence but intervene and supervise when necessary  - Involve family in performance of ADLs  - Assess for home care needs following discharge   - Consider OT consult to assist with ADL evaluation and planning for discharge  - Provide patient education as appropriate  Outcome: Progressing  Goal: Maintains/Returns to pre admission functional level  Description: INTERVENTIONS:  - Perform BMAT or MOVE assessment daily    - Set and communicate daily mobility goal to care team and patient/family/caregiver  - Collaborate with rehabilitation services on mobility goals if consulted  Outcome: Progressing     Problem: DISCHARGE PLANNING  Goal: Discharge to home or other facility with appropriate resources  Description: INTERVENTIONS:  - Identify barriers to discharge w/patient and caregiver  - Arrange for needed discharge resources and transportation as appropriate  - Identify discharge learning needs (meds, wound care, etc )  - Arrange for interpretive services to assist at discharge as needed  - Refer to Case Management Department for coordinating discharge planning if the patient needs post-hospital services based on physician/advanced practitioner order or complex needs related to functional status, cognitive ability, or social support system  Outcome: Progressing     Problem: Knowledge Deficit  Goal: Patient/family/caregiver demonstrates understanding of disease process, treatment plan, medications, and discharge instructions  Description: Complete learning assessment and assess knowledge base    Interventions:  - Provide teaching at level of understanding  - Provide teaching via preferred learning methods  Outcome: Progressing

## 2023-02-28 LAB
ERYTHROCYTE [DISTWIDTH] IN BLOOD BY AUTOMATED COUNT: 13.2 % (ref 11.6–15.1)
HCT VFR BLD AUTO: 26.1 % (ref 34.8–46.1)
HGB BLD-MCNC: 8.1 G/DL (ref 11.5–15.4)
MCH RBC QN AUTO: 29.7 PG (ref 26.8–34.3)
MCHC RBC AUTO-ENTMCNC: 31 G/DL (ref 31.4–37.4)
MCV RBC AUTO: 96 FL (ref 82–98)
PLATELET # BLD AUTO: 158 THOUSANDS/UL (ref 149–390)
PMV BLD AUTO: 12.2 FL (ref 8.9–12.7)
RBC # BLD AUTO: 2.73 MILLION/UL (ref 3.81–5.12)
WBC # BLD AUTO: 12.56 THOUSAND/UL (ref 4.31–10.16)

## 2023-02-28 RX ADMIN — DOCUSATE SODIUM 100 MG: 100 CAPSULE, LIQUID FILLED ORAL at 17:55

## 2023-02-28 RX ADMIN — SODIUM CHLORIDE, SODIUM LACTATE, POTASSIUM CHLORIDE, AND CALCIUM CHLORIDE 125 ML/HR: .6; .31; .03; .02 INJECTION, SOLUTION INTRAVENOUS at 13:24

## 2023-02-28 RX ADMIN — ACETAMINOPHEN 650 MG: 325 TABLET ORAL at 12:22

## 2023-02-28 RX ADMIN — ACETAMINOPHEN 650 MG: 325 TABLET ORAL at 17:55

## 2023-02-28 RX ADMIN — SODIUM CHLORIDE, SODIUM LACTATE, POTASSIUM CHLORIDE, AND CALCIUM CHLORIDE 125 ML/HR: .6; .31; .03; .02 INJECTION, SOLUTION INTRAVENOUS at 17:33

## 2023-02-28 RX ADMIN — IRON SUCROSE 200 MG: 20 INJECTION, SOLUTION INTRAVENOUS at 14:47

## 2023-02-28 RX ADMIN — KETOROLAC TROMETHAMINE 30 MG: 30 INJECTION, SOLUTION INTRAMUSCULAR at 17:55

## 2023-02-28 RX ADMIN — DOCUSATE SODIUM 100 MG: 100 CAPSULE, LIQUID FILLED ORAL at 09:39

## 2023-02-28 RX ADMIN — KETOROLAC TROMETHAMINE 30 MG: 30 INJECTION, SOLUTION INTRAMUSCULAR at 00:03

## 2023-02-28 RX ADMIN — KETOROLAC TROMETHAMINE 30 MG: 30 INJECTION, SOLUTION INTRAMUSCULAR at 12:22

## 2023-02-28 RX ADMIN — KETOROLAC TROMETHAMINE 30 MG: 30 INJECTION, SOLUTION INTRAMUSCULAR at 05:53

## 2023-02-28 RX ADMIN — ENOXAPARIN SODIUM 40 MG: 40 INJECTION SUBCUTANEOUS at 09:40

## 2023-02-28 NOTE — PROGRESS NOTES
Progress Note - OB/GYN  Rivera Garcia 27 y o  female MRN: [id number]  Unit/Bed#: -01 Encounter: [id number]    Assessment and Plan:  Rivera Garcia is a patient of: [address]  She is PPD# 1 s/p  primary  section, low transverse incision and bilateral salpingectomy for di-di twins with transverse-transverse presentation  Recovering well and is stable  By issue:      * Status post primary low transverse  section  Assessment & Plan   mL  Hgb 10 8 --> 8 1 --> Venofer  Pain well controlled  Void trial pending (rodriguez removed at midnight)  No gas or BM since surgery  DVT ppx with SCDs and Lovenox 40 mg daily (BMI 39)  Breastfeeding  Lochia wnl  · Venofer ordered  · Follow up void trial  · Monitor for return of bowel function  · Continue routine postpartum care    Maternal varicella, non-immune  Assessment & Plan  Varicella non-immune  · Varivax ordered    Elevated blood pressure reading without diagnosis of hypertension  Assessment & Plan  141/77 in office 02/15/23 then 141/77 in triage, not  by 4 hr   PreE labs wnl on admission  Normotensive this admission  · Monitor BP  · Monitor for signs/symptoms of HTN / PreE      Disposition    - Anticipate discharge home on PPD# 2      Subjective/Objective     Chief Complaint: Postpartum State     Subjective:    Rivera Garcia is PPD/POD#1 s/p  primary  section, low transverse incision and bilateral salpingectomy for di-di twins with transverse-transverse presentations  She has no current complaints  Pain is well controlled  Patient is not currently voiding (void trial pending s/p rodriguez removal at midnight)  She is ambulating  Patient is not currently passing flatus and has had no bowel movement  She is tolerating PO, and denies nausea or vomitting  Patient denies fever, chills, chest pain, shortness of breath, or calf tenderness  Lochia is normal  She is  Bottle feeding  She is recovering well and is stable  Vitals:   /55 (BP Location: Right arm)   Pulse 86   Temp 98 1 °F (36 7 °C) (Oral)   Resp 16   Ht 5' 6" (1 676 m)   Wt 109 kg (241 lb)   LMP 06/05/2022   SpO2 97%   Breastfeeding No   BMI 38 90 kg/m²       Intake/Output Summary (Last 24 hours) at 2/28/2023 1411  Last data filed at 2/27/2023 2353  Gross per 24 hour   Intake 1985 ml   Output 1449 ml   Net 536 ml       Invasive Devices     Peripheral Intravenous Line  Duration           Peripheral IV 02/27/23 Left;Ventral (anterior) Hand <1 day    Peripheral IV 02/27/23 Right;Ventral (anterior) Hand <1 day                Physical Exam:   GEN: Corinna Mcdowell appears well, alert and oriented x 3, pleasant and cooperative   CARDIO: warm and well-perfused  RESP: non-labored breathing  ABDOMEN: soft, no tenderness, no distention, fundus @ U -1, Incision C/D/I  EXTREMITIES: SCDs on, non tender, no erythema, b/l Phoebe's sign negative      Labs:     Hemoglobin   Date Value Ref Range Status   02/28/2023 8 1 (L) 11 5 - 15 4 g/dL Final   02/27/2023 10 8 (L) 11 5 - 15 4 g/dL Final     WBC   Date Value Ref Range Status   02/28/2023 12 56 (H) 4 31 - 10 16 Thousand/uL Final   02/27/2023 6 79 4 31 - 10 16 Thousand/uL Final     Platelets   Date Value Ref Range Status   02/28/2023 158 149 - 390 Thousands/uL Final   02/27/2023 179 149 - 390 Thousands/uL Final     Creatinine   Date Value Ref Range Status   02/27/2023 0 62 0 60 - 1 30 mg/dL Final     Comment:     Standardized to IDMS reference method   02/15/2023 0 58 (L) 0 60 - 1 30 mg/dL Final     Comment:     Standardized to IDMS reference method     AST   Date Value Ref Range Status   02/27/2023 21 13 - 39 U/L Final     Comment:     Specimen collection should occur prior to Sulfasalazine administration due to the potential for falsely depressed results      02/15/2023 19 13 - 39 U/L Final     Comment:     Specimen collection should occur prior to Sulfasalazine administration due to the potential for falsely depressed results  ALT   Date Value Ref Range Status   02/27/2023 15 7 - 52 U/L Final     Comment:     Specimen collection should occur prior to Sulfasalazine administration due to the potential for falsely depressed results  02/15/2023 13 7 - 52 U/L Final     Comment:     Specimen collection should occur prior to Sulfasalazine administration due to the potential for falsely depressed results             Jackson Goodell, MD  2/28/2023  6:58 AM

## 2023-02-28 NOTE — PLAN OF CARE
Problem: PAIN - ADULT  Goal: Verbalizes/displays adequate comfort level or baseline comfort level  Description: Interventions:  - Encourage patient to monitor pain and request assistance  - Assess pain using appropriate pain scale  - Administer analgesics based on type and severity of pain and evaluate response  - Implement non-pharmacological measures as appropriate and evaluate response  - Consider cultural and social influences on pain and pain management  - Notify physician/advanced practitioner if interventions unsuccessful or patient reports new pain  2/28/2023 1138 by Lenor Osler, RN  Outcome: Progressing  2/28/2023 1138 by Lenor Osler, RN  Outcome: Progressing     Problem: INFECTION - ADULT  Goal: Absence or prevention of progression during hospitalization  Description: INTERVENTIONS:  - Assess and monitor for signs and symptoms of infection  - Monitor lab/diagnostic results  - Monitor all insertion sites, i e  indwelling lines, tubes, and drains  - Monitor endotracheal if appropriate and nasal secretions for changes in amount and color  - [place] appropriate cooling/warming therapies per order  - Administer medications as ordered  - Instruct and encourage patient and family to use good hand hygiene technique  - Identify and instruct in appropriate isolation precautions for identified infection/condition  2/28/2023 1138 by Lenor Osler, RN  Outcome: Progressing  2/28/2023 1138 by Lenor Osler, RN  Outcome: Progressing  Goal: Absence of fever/infection during neutropenic period  Description: INTERVENTIONS:  - Monitor WBC    2/28/2023 1138 by Lenor Osler, RN  Outcome: Progressing  2/28/2023 1138 by Lenor Osler, RN  Outcome: Progressing     Problem: SAFETY ADULT  Goal: Patient will remain free of falls  Description: INTERVENTIONS:  - Educate patient/family on patient safety including physical limitations  - Instruct patient to call for assistance with activity   - Consult OT/PT to assist with strengthening/mobility   - Keep Call bell within reach  - Keep bed low and locked with side rails adjusted as appropriate  - Keep care items and personal belongings within reach  - Initiate and maintain comfort rounds  2/28/2023 1138 by Mariella Khan RN  Outcome: Progressing  2/28/2023 1138 by Mariella Khan RN  Outcome: Progressing  Goal: Maintain or return to baseline ADL function  Description: INTERVENTIONS:  -  Assess patient's ability to carry out ADLs; assess patient's baseline for ADL function and identify physical deficits which impact ability to perform ADLs (bathing, care of mouth/teeth, toileting, grooming, dressing, etc )  - Assess/evaluate cause of self-care deficits   - Assess range of motion  - Assess patient's mobility; develop plan if impaired  - Assess patient's need for assistive devices and provide as appropriate  - Encourage maximum independence but intervene and supervise when necessary  - Involve family in performance of ADLs  - Assess for home care needs following discharge   - Consider OT consult to assist with ADL evaluation and planning for discharge  - Provide patient education as appropriate  2/28/2023 1138 by Mariella Khan RN  Outcome: Progressing  2/28/2023 1138 by Mariella Khan RN  Outcome: Progressing  Goal: Maintains/Returns to pre admission functional level  Description: INTERVENTIONS:  - Perform BMAT or MOVE assessment daily    - Set and communicate daily mobility goal to care team and patient/family/caregiver     - Collaborate with rehabilitation services on mobility goals if consulted  2/28/2023 1138 by Mariella Khan RN  Outcome: Vickye Clamp  2/28/2023 1138 by Mariella Khan RN  Outcome: Progressing     Problem: DISCHARGE PLANNING  Goal: Discharge to home or other facility with appropriate resources  Description: INTERVENTIONS:  - Identify barriers to discharge w/patient and caregiver  - Arrange for needed discharge resources and transportation as appropriate  - Identify discharge learning needs (meds, wound care, etc )  - Arrange for interpretive services to assist at discharge as needed  - Refer to Case Management Department for coordinating discharge planning if the patient needs post-hospital services based on physician/advanced practitioner order or complex needs related to functional status, cognitive ability, or social support system  2023 by Cuauhtemoc Brown RN  Outcome: Chhaya Dues  2023 by Cuauhtemoc Brown RN  Outcome: Progressing     Problem: Knowledge Deficit  Goal: Patient/family/caregiver demonstrates understanding of disease process, treatment plan, medications, and discharge instructions  Description: Complete learning assessment and assess knowledge base    Interventions:  - Provide teaching at level of understanding  - Provide teaching via preferred learning methods  2023 by Cuauhtemoc Brown RN  Outcome:  Dues  2023 by Cuauhtemoc Brown RN  Outcome: Progressing     Problem: POSTPARTUM  Goal: Experiences normal postpartum course  Description: INTERVENTIONS:  - Monitor maternal vital signs  - Assess uterine involution and lochia  2023 1138 by Cuauhtemoc Brown RN  Outcome: Progressing  2023 by Cuauhtemoc Brown RN  Outcome: Progressing  Goal: Appropriate maternal -  bonding  Description: INTERVENTIONS:  - Identify family support  - Assess for appropriate maternal/infant bonding   -Encourage maternal/infant bonding opportunities  - Referral to  or  as needed  2023 by Cuauhtemoc Brown RN  Outcome: Progressing  2023 by Cuauhtemoc Brown RN  Outcome: Progressing  Goal: Establishment of infant feeding pattern  Description: INTERVENTIONS:  - Assess breast/bottle feeding  - Refer to lactation as needed  2023 1138 by Cuauhtemoc Brown RN  Outcome: Progressing  2023 by Cuauhtemoc Brown RN  Outcome: Progressing  Goal: Incision(s), wounds(s) or drain site(s) healing without S/S of infection  Description: INTERVENTIONS  - Assess and document dressing, incision, wound bed, drain sites and surrounding tissue  - Provide patient and family education    2/28/2023 1138 by Mariella Khan RN  Outcome: Progressing  2/28/2023 1138 by Mariella Khan, RN  Outcome: Progressing

## 2023-02-28 NOTE — PLAN OF CARE
Problem: PAIN - ADULT  Goal: Verbalizes/displays adequate comfort level or baseline comfort level  Description: Interventions:  - Encourage patient to monitor pain and request assistance  - Assess pain using appropriate pain scale  - Administer analgesics based on type and severity of pain and evaluate response  - Implement non-pharmacological measures as appropriate and evaluate response  - Consider cultural and social influences on pain and pain management  - Notify physician/advanced practitioner if interventions unsuccessful or patient reports new pain  Outcome: Progressing     Problem: INFECTION - ADULT  Goal: Absence or prevention of progression during hospitalization  Description: INTERVENTIONS:  - Assess and monitor for signs and symptoms of infection  - Monitor lab/diagnostic results  - Monitor all insertion sites, i e  indwelling lines, tubes, and drains  - Monitor endotracheal if appropriate and nasal secretions for changes in amount and color  - [place] appropriate cooling/warming therapies per order  - Administer medications as ordered  - Instruct and encourage patient and family to use good hand hygiene technique  - Identify and instruct in appropriate isolation precautions for identified infection/condition  Outcome: Progressing  Goal: Absence of fever/infection during neutropenic period  Description: INTERVENTIONS:  - Monitor WBC    Outcome: Progressing     Problem: SAFETY ADULT  Goal: Patient will remain free of falls  Description: INTERVENTIONS:  - Educate patient/family on patient safety including physical limitations  - Instruct patient to call for assistance with activity   - Consult OT/PT to assist with strengthening/mobility   - Keep Call bell within reach  - Keep bed low and locked with side rails adjusted as appropriate  - Keep care items and personal belongings within reach  - Initiate and maintain comfort rounds  Outcome: Progressing  Goal: Maintain or return to baseline ADL function  Description: INTERVENTIONS:  -  Assess patient's ability to carry out ADLs; assess patient's baseline for ADL function and identify physical deficits which impact ability to perform ADLs (bathing, care of mouth/teeth, toileting, grooming, dressing, etc )  - Assess/evaluate cause of self-care deficits   - Assess range of motion  - Assess patient's mobility; develop plan if impaired  - Assess patient's need for assistive devices and provide as appropriate  - Encourage maximum independence but intervene and supervise when necessary  - Involve family in performance of ADLs  - Assess for home care needs following discharge   - Consider OT consult to assist with ADL evaluation and planning for discharge  - Provide patient education as appropriate  Outcome: Progressing  Goal: Maintains/Returns to pre admission functional level  Description: INTERVENTIONS:  - Perform BMAT or MOVE assessment daily    - Set and communicate daily mobility goal to care team and patient/family/caregiver     - Collaborate with rehabilitation services on mobility goals if consulted  Outcome: Progressing     Problem: DISCHARGE PLANNING  Goal: Discharge to home or other facility with appropriate resources  Description: INTERVENTIONS:  - Identify barriers to discharge w/patient and caregiver  - Arrange for needed discharge resources and transportation as appropriate  - Identify discharge learning needs (meds, wound care, etc )  - Arrange for interpretive services to assist at discharge as needed  - Refer to Case Management Department for coordinating discharge planning if the patient needs post-hospital services based on physician/advanced practitioner order or complex needs related to functional status, cognitive ability, or social support system  Outcome: Progressing     Problem: Knowledge Deficit  Goal: Patient/family/caregiver demonstrates understanding of disease process, treatment plan, medications, and discharge instructions  Description: Complete learning assessment and assess knowledge base    Interventions:  - Provide teaching at level of understanding  - Provide teaching via preferred learning methods  Outcome: Progressing     Problem: POSTPARTUM  Goal: Experiences normal postpartum course  Description: INTERVENTIONS:  - Monitor maternal vital signs  - Assess uterine involution and lochia  Outcome: Progressing  Goal: Appropriate maternal -  bonding  Description: INTERVENTIONS:  - Identify family support  - Assess for appropriate maternal/infant bonding   -Encourage maternal/infant bonding opportunities  - Referral to  or  as needed  Outcome: Progressing  Goal: Establishment of infant feeding pattern  Description: INTERVENTIONS:  - Assess breast/bottle feeding  - Refer to lactation as needed  Outcome: Progressing  Goal: Incision(s), wounds(s) or drain site(s) healing without S/S of infection  Description: INTERVENTIONS  - Assess and document dressing, incision, wound bed, drain sites and surrounding tissue  - Provide patient and family education    Outcome: Progressing

## 2023-02-28 NOTE — PLAN OF CARE
Problem: PAIN - ADULT  Goal: Verbalizes/displays adequate comfort level or baseline comfort level  Description: Interventions:  - Encourage patient to monitor pain and request assistance  - Assess pain using appropriate pain scale  - Administer analgesics based on type and severity of pain and evaluate response  - Implement non-pharmacological measures as appropriate and evaluate response  - Consider cultural and social influences on pain and pain management  - Notify physician/advanced practitioner if interventions unsuccessful or patient reports new pain  Outcome: Progressing     Problem: INFECTION - ADULT  Goal: Absence or prevention of progression during hospitalization  Description: INTERVENTIONS:  - Assess and monitor for signs and symptoms of infection  - Monitor lab/diagnostic results  - Monitor all insertion sites, i e  indwelling lines, tubes, and drains  - Monitor endotracheal if appropriate and nasal secretions for changes in amount and color  - [place] appropriate cooling/warming therapies per order  - Administer medications as ordered  - Instruct and encourage patient and family to use good hand hygiene technique  - Identify and instruct in appropriate isolation precautions for identified infection/condition  Outcome: Progressing  Goal: Absence of fever/infection during neutropenic period  Description: INTERVENTIONS:  - Monitor WBC    Outcome: Progressing     Problem: SAFETY ADULT  Goal: Patient will remain free of falls  Description: INTERVENTIONS:  - Educate patient/family on patient safety including physical limitations  - Instruct patient to call for assistance with activity   - Consult OT/PT to assist with strengthening/mobility   - Keep Call bell within reach  - Keep bed low and locked with side rails adjusted as appropriate  - Keep care items and personal belongings within reach  - Initiate and maintain comfort rounds  Outcome: Progressing  Goal: Maintain or return to baseline ADL function  Description: INTERVENTIONS:  -  Assess patient's ability to carry out ADLs; assess patient's baseline for ADL function and identify physical deficits which impact ability to perform ADLs (bathing, care of mouth/teeth, toileting, grooming, dressing, etc )  - Assess/evaluate cause of self-care deficits   - Assess range of motion  - Assess patient's mobility; develop plan if impaired  - Assess patient's need for assistive devices and provide as appropriate  - Encourage maximum independence but intervene and supervise when necessary  - Involve family in performance of ADLs  - Assess for home care needs following discharge   - Consider OT consult to assist with ADL evaluation and planning for discharge  - Provide patient education as appropriate  Outcome: Progressing  Goal: Maintains/Returns to pre admission functional level  Description: INTERVENTIONS:  - Perform BMAT or MOVE assessment daily    - Set and communicate daily mobility goal to care team and patient/family/caregiver     - Collaborate with rehabilitation services on mobility goals if consulted  Outcome: Progressing     Problem: DISCHARGE PLANNING  Goal: Discharge to home or other facility with appropriate resources  Description: INTERVENTIONS:  - Identify barriers to discharge w/patient and caregiver  - Arrange for needed discharge resources and transportation as appropriate  - Identify discharge learning needs (meds, wound care, etc )  - Arrange for interpretive services to assist at discharge as needed  - Refer to Case Management Department for coordinating discharge planning if the patient needs post-hospital services based on physician/advanced practitioner order or complex needs related to functional status, cognitive ability, or social support system  Outcome: Progressing     Problem: Knowledge Deficit  Goal: Patient/family/caregiver demonstrates understanding of disease process, treatment plan, medications, and discharge instructions  Description: Complete learning assessment and assess knowledge base    Interventions:  - Provide teaching at level of understanding  - Provide teaching via preferred learning methods  Outcome: Progressing     Problem: POSTPARTUM  Goal: Experiences normal postpartum course  Description: INTERVENTIONS:  - Monitor maternal vital signs  - Assess uterine involution and lochia  Outcome: Progressing  Goal: Appropriate maternal -  bonding  Description: INTERVENTIONS:  - Identify family support  - Assess for appropriate maternal/infant bonding   -Encourage maternal/infant bonding opportunities  - Referral to  or  as needed  Outcome: Progressing  Goal: Establishment of infant feeding pattern  Description: INTERVENTIONS:  - Assess breast/bottle feeding  - Refer to lactation as needed  Outcome: Progressing  Goal: Incision(s), wounds(s) or drain site(s) healing without S/S of infection  Description: INTERVENTIONS  - Assess and document dressing, incision, wound bed, drain sites and surrounding tissue  - Provide patient and family education    Outcome: Progressing DISPLAY PLAN FREE TEXT DISPLAY PLAN FREE TEXT DISPLAY PLAN FREE TEXT DISPLAY PLAN FREE TEXT

## 2023-03-01 LAB — GP B STREP DNA SPEC QL NAA+PROBE: NEGATIVE

## 2023-03-01 RX ADMIN — ENOXAPARIN SODIUM 40 MG: 40 INJECTION SUBCUTANEOUS at 10:37

## 2023-03-01 RX ADMIN — DOCUSATE SODIUM 100 MG: 100 CAPSULE, LIQUID FILLED ORAL at 18:24

## 2023-03-01 RX ADMIN — IBUPROFEN 600 MG: 600 TABLET, FILM COATED ORAL at 06:13

## 2023-03-01 RX ADMIN — ACETAMINOPHEN 650 MG: 325 TABLET ORAL at 00:46

## 2023-03-01 RX ADMIN — IBUPROFEN 600 MG: 600 TABLET, FILM COATED ORAL at 12:04

## 2023-03-01 RX ADMIN — ACETAMINOPHEN 650 MG: 325 TABLET ORAL at 12:04

## 2023-03-01 RX ADMIN — KETOROLAC TROMETHAMINE 30 MG: 30 INJECTION, SOLUTION INTRAMUSCULAR at 00:46

## 2023-03-01 RX ADMIN — DOCUSATE SODIUM 100 MG: 100 CAPSULE, LIQUID FILLED ORAL at 10:37

## 2023-03-01 RX ADMIN — IBUPROFEN 600 MG: 600 TABLET, FILM COATED ORAL at 18:24

## 2023-03-01 RX ADMIN — ACETAMINOPHEN 650 MG: 325 TABLET ORAL at 18:24

## 2023-03-01 RX ADMIN — ACETAMINOPHEN 650 MG: 325 TABLET ORAL at 06:13

## 2023-03-01 NOTE — PLAN OF CARE
Problem: PAIN - ADULT  Goal: Verbalizes/displays adequate comfort level or baseline comfort level  Description: Interventions:  - Encourage patient to monitor pain and request assistance  - Assess pain using appropriate pain scale  - Administer analgesics based on type and severity of pain and evaluate response  - Implement non-pharmacological measures as appropriate and evaluate response  - Consider cultural and social influences on pain and pain management  - Notify physician/advanced practitioner if interventions unsuccessful or patient reports new pain  Outcome: Progressing     Problem: INFECTION - ADULT  Goal: Absence or prevention of progression during hospitalization  Description: INTERVENTIONS:  - Assess and monitor for signs and symptoms of infection  - Monitor lab/diagnostic results  - Monitor all insertion sites, i e  indwelling lines, tubes, and drains  - Monitor endotracheal if appropriate and nasal secretions for changes in amount and color  - [place] appropriate cooling/warming therapies per order  - Administer medications as ordered  - Instruct and encourage patient and family to use good hand hygiene technique  - Identify and instruct in appropriate isolation precautions for identified infection/condition  Outcome: Progressing  Goal: Absence of fever/infection during neutropenic period  Description: INTERVENTIONS:  - Monitor WBC    Outcome: Progressing     Problem: SAFETY ADULT  Goal: Patient will remain free of falls  Description: INTERVENTIONS:  - Educate patient/family on patient safety including physical limitations  - Instruct patient to call for assistance with activity   - Consult OT/PT to assist with strengthening/mobility   - Keep Call bell within reach  - Keep bed low and locked with side rails adjusted as appropriate  - Keep care items and personal belongings within reach  - Initiate and maintain comfort rounds  Outcome: Progressing  Goal: Maintain or return to baseline ADL function  Description: INTERVENTIONS:  -  Assess patient's ability to carry out ADLs; assess patient's baseline for ADL function and identify physical deficits which impact ability to perform ADLs (bathing, care of mouth/teeth, toileting, grooming, dressing, etc )  - Assess/evaluate cause of self-care deficits   - Assess range of motion  - Assess patient's mobility; develop plan if impaired  - Assess patient's need for assistive devices and provide as appropriate  - Encourage maximum independence but intervene and supervise when necessary  - Involve family in performance of ADLs  - Assess for home care needs following discharge   - Consider OT consult to assist with ADL evaluation and planning for discharge  - Provide patient education as appropriate  Outcome: Progressing  Goal: Maintains/Returns to pre admission functional level  Description: INTERVENTIONS:  - Perform BMAT or MOVE assessment daily    - Set and communicate daily mobility goal to care team and patient/family/caregiver     - Collaborate with rehabilitation services on mobility goals if consulted  Outcome: Progressing     Problem: DISCHARGE PLANNING  Goal: Discharge to home or other facility with appropriate resources  Description: INTERVENTIONS:  - Identify barriers to discharge w/patient and caregiver  - Arrange for needed discharge resources and transportation as appropriate  - Identify discharge learning needs (meds, wound care, etc )  - Arrange for interpretive services to assist at discharge as needed  - Refer to Case Management Department for coordinating discharge planning if the patient needs post-hospital services based on physician/advanced practitioner order or complex needs related to functional status, cognitive ability, or social support system  Outcome: Progressing     Problem: Knowledge Deficit  Goal: Patient/family/caregiver demonstrates understanding of disease process, treatment plan, medications, and discharge instructions  Description: Complete learning assessment and assess knowledge base    Interventions:  - Provide teaching at level of understanding  - Provide teaching via preferred learning methods  Outcome: Progressing     Problem: POSTPARTUM  Goal: Experiences normal postpartum course  Description: INTERVENTIONS:  - Monitor maternal vital signs  - Assess uterine involution and lochia  Outcome: Progressing  Goal: Appropriate maternal -  bonding  Description: INTERVENTIONS:  - Identify family support  - Assess for appropriate maternal/infant bonding   -Encourage maternal/infant bonding opportunities  - Referral to  or  as needed  Outcome: Progressing  Goal: Establishment of infant feeding pattern  Description: INTERVENTIONS:  - Assess breast/bottle feeding  - Refer to lactation as needed  Outcome: Progressing  Goal: Incision(s), wounds(s) or drain site(s) healing without S/S of infection  Description: INTERVENTIONS  - Assess and document dressing, incision, wound bed, drain sites and surrounding tissue  - Provide patient and family education    Outcome: Progressing

## 2023-03-01 NOTE — PLAN OF CARE
Problem: PAIN - ADULT  Goal: Verbalizes/displays adequate comfort level or baseline comfort level  Description: Interventions:  - Encourage patient to monitor pain and request assistance  - Assess pain using appropriate pain scale  - Administer analgesics based on type and severity of pain and evaluate response  - Implement non-pharmacological measures as appropriate and evaluate response  - Consider cultural and social influences on pain and pain management  - Notify physician/advanced practitioner if interventions unsuccessful or patient reports new pain  Outcome: Progressing     Problem: INFECTION - ADULT  Goal: Absence or prevention of progression during hospitalization  Description: INTERVENTIONS:  - Assess and monitor for signs and symptoms of infection  - Monitor lab/diagnostic results  - Monitor all insertion sites, i e  indwelling lines, tubes, and drains  - Monitor endotracheal if appropriate and nasal secretions for changes in amount and color  - [place] appropriate cooling/warming therapies per order  - Administer medications as ordered  - Instruct and encourage patient and family to use good hand hygiene technique  - Identify and instruct in appropriate isolation precautions for identified infection/condition  Outcome: Progressing  Goal: Absence of fever/infection during neutropenic period  Description: INTERVENTIONS:  - Monitor WBC    Outcome: Progressing     Problem: SAFETY ADULT  Goal: Patient will remain free of falls  Description: INTERVENTIONS:  - Educate patient/family on patient safety including physical limitations  - Instruct patient to call for assistance with activity   - Consult OT/PT to assist with strengthening/mobility   - Keep Call bell within reach  - Keep bed low and locked with side rails adjusted as appropriate  - Keep care items and personal belongings within reach  - Initiate and maintain comfort rounds  Outcome: Progressing  Goal: Maintain or return to baseline ADL function  Description: INTERVENTIONS:  -  Assess patient's ability to carry out ADLs; assess patient's baseline for ADL function and identify physical deficits which impact ability to perform ADLs (bathing, care of mouth/teeth, toileting, grooming, dressing, etc )  - Assess/evaluate cause of self-care deficits   - Assess range of motion  - Assess patient's mobility; develop plan if impaired  - Assess patient's need for assistive devices and provide as appropriate  - Encourage maximum independence but intervene and supervise when necessary  - Involve family in performance of ADLs  - Assess for home care needs following discharge   - Consider OT consult to assist with ADL evaluation and planning for discharge  - Provide patient education as appropriate  Outcome: Progressing  Goal: Maintains/Returns to pre admission functional level  Description: INTERVENTIONS:  - Perform BMAT or MOVE assessment daily    - Set and communicate daily mobility goal to care team and patient/family/caregiver     - Collaborate with rehabilitation services on mobility goals if consulted  Outcome: Progressing     Problem: DISCHARGE PLANNING  Goal: Discharge to home or other facility with appropriate resources  Description: INTERVENTIONS:  - Identify barriers to discharge w/patient and caregiver  - Arrange for needed discharge resources and transportation as appropriate  - Identify discharge learning needs (meds, wound care, etc )  - Arrange for interpretive services to assist at discharge as needed  - Refer to Case Management Department for coordinating discharge planning if the patient needs post-hospital services based on physician/advanced practitioner order or complex needs related to functional status, cognitive ability, or social support system  Outcome: Progressing     Problem: Knowledge Deficit  Goal: Patient/family/caregiver demonstrates understanding of disease process, treatment plan, medications, and discharge instructions  Description: Complete learning assessment and assess knowledge base    Interventions:  - Provide teaching at level of understanding  - Provide teaching via preferred learning methods  Outcome: Progressing     Problem: POSTPARTUM  Goal: Experiences normal postpartum course  Description: INTERVENTIONS:  - Monitor maternal vital signs  - Assess uterine involution and lochia  Outcome: Progressing  Goal: Appropriate maternal -  bonding  Description: INTERVENTIONS:  - Identify family support  - Assess for appropriate maternal/infant bonding   -Encourage maternal/infant bonding opportunities  - Referral to  or  as needed  Outcome: Progressing  Goal: Establishment of infant feeding pattern  Description: INTERVENTIONS:  - Assess breast/bottle feeding  - Refer to lactation as needed  Outcome: Progressing  Goal: Incision(s), wounds(s) or drain site(s) healing without S/S of infection  Description: INTERVENTIONS  - Assess and document dressing, incision, wound bed, drain sites and surrounding tissue  - Provide patient and family education    Outcome: Progressing

## 2023-03-01 NOTE — PROGRESS NOTES
Progress Note - OB/GYN  Loraine Riley 27 y o  female MRN: [id number]  Unit/Bed#: -01 Encounter: [id number]    Assessment and Plan:  Loraine Riley is a patient of: [address]  She is POD# 2 s/p  primary  section, low transverse incision and bilateral salpingectomy for di-di twins with transverse-transverse presentation  Recovering well and is stable  By issue:      Maternal varicella, non-immune  Assessment & Plan  Varicella non-immune  · Varivax ordered    Elevated blood pressure reading without diagnosis of hypertension  Assessment & Plan  141/77 in office 02/15/23 then 141/77 in triage, not  by 4 hr   PreE labs wnl on admission  Normotensive this admission  · Monitor BP  · Monitor for signs/symptoms of HTN / PreE    * Status post primary low transverse  section  Assessment & Plan   mL  Hgb 10 8 --> 8 1 --> Venofer  Pain well controlled  Voiding spontaneously  No gas or BM since surgery  DVT ppx with SCDs and Lovenox 40 mg daily (BMI 39)  Breastfeeding  Lochia wnl  · Monitor for return of bowel function  · Continue routine postpartum care      Disposition    - Anticipate discharge home on PPD# 2-3      Subjective/Objective     Chief Complaint: Postpartum State     Subjective:    Loraine Riley is PPD/POD#2 s/p  primary  section, low transverse incision and bilateral salpingectomy for di-di twins with transverse-transverse presentations  She has no current complaints  Pain is well controlled  Patient is currently voiding  She is ambulating  Patient is currently passing flatus and has had no bowel movement  She is tolerating PO, and denies nausea or vomitting  Patient denies fever, chills, chest pain, shortness of breath, or calf tenderness  Lochia is normal  She is  Bottle feeding  She is recovering well and is stable         Vitals:   /77 (BP Location: Left arm)   Pulse 76   Temp 97 9 °F (36 6 °C) (Oral)   Resp 18   Ht 5' 6" (1 676 m)   Wt 109 kg (241 lb)   LMP 06/05/2022   SpO2 99%   Breastfeeding No   BMI 38 90 kg/m²       Intake/Output Summary (Last 24 hours) at 3/1/2023 0553  Last data filed at 3/1/2023 0500  Gross per 24 hour   Intake 950 ml   Output 3450 ml   Net -2500 ml       Invasive Devices     Peripheral Intravenous Line  Duration           Peripheral IV 02/27/23 Right;Ventral (anterior) Hand 1 day                Physical Exam:   GEN: Mennie Nipple appears well, alert and oriented x 3, pleasant and cooperative   CARDIO: warm and well-perfused  RESP: non-labored breathing  ABDOMEN: soft, no tenderness, no distention, fundus @ U -1, Incision C/D/I  EXTREMITIES: SCDs on, non tender, no erythema, b/l Phoebe's sign negative      Labs:     Hemoglobin   Date Value Ref Range Status   02/28/2023 8 1 (L) 11 5 - 15 4 g/dL Final   02/27/2023 10 8 (L) 11 5 - 15 4 g/dL Final     WBC   Date Value Ref Range Status   02/28/2023 12 56 (H) 4 31 - 10 16 Thousand/uL Final   02/27/2023 6 79 4 31 - 10 16 Thousand/uL Final     Platelets   Date Value Ref Range Status   02/28/2023 158 149 - 390 Thousands/uL Final   02/27/2023 179 149 - 390 Thousands/uL Final     Creatinine   Date Value Ref Range Status   02/27/2023 0 62 0 60 - 1 30 mg/dL Final     Comment:     Standardized to IDMS reference method   02/15/2023 0 58 (L) 0 60 - 1 30 mg/dL Final     Comment:     Standardized to IDMS reference method     AST   Date Value Ref Range Status   02/27/2023 21 13 - 39 U/L Final     Comment:     Specimen collection should occur prior to Sulfasalazine administration due to the potential for falsely depressed results  02/15/2023 19 13 - 39 U/L Final     Comment:     Specimen collection should occur prior to Sulfasalazine administration due to the potential for falsely depressed results        ALT   Date Value Ref Range Status   02/27/2023 15 7 - 52 U/L Final     Comment:     Specimen collection should occur prior to Sulfasalazine administration due to the potential for falsely depressed results  02/15/2023 13 7 - 52 U/L Final     Comment:     Specimen collection should occur prior to Sulfasalazine administration due to the potential for falsely depressed results             Clinton Watson MD  3/1/2023  5:53 AM

## 2023-03-02 VITALS
WEIGHT: 241 LBS | RESPIRATION RATE: 18 BRPM | DIASTOLIC BLOOD PRESSURE: 69 MMHG | HEIGHT: 66 IN | TEMPERATURE: 98 F | SYSTOLIC BLOOD PRESSURE: 130 MMHG | OXYGEN SATURATION: 97 % | BODY MASS INDEX: 38.73 KG/M2 | HEART RATE: 60 BPM

## 2023-03-02 RX ORDER — ACETAMINOPHEN 325 MG/1
650 TABLET ORAL EVERY 6 HOURS SCHEDULED
Qty: 8 TABLET | Refills: 0 | Status: SHIPPED | OUTPATIENT
Start: 2023-03-02 | End: 2023-03-03

## 2023-03-02 RX ORDER — DOCUSATE SODIUM 100 MG/1
100 CAPSULE, LIQUID FILLED ORAL 2 TIMES DAILY
Refills: 0
Start: 2023-03-02 | End: 2023-03-09

## 2023-03-02 RX ORDER — IBUPROFEN 600 MG/1
600 TABLET ORAL EVERY 6 HOURS
Qty: 30 TABLET | Refills: 0 | Status: SHIPPED | OUTPATIENT
Start: 2023-03-02

## 2023-03-02 RX ADMIN — ACETAMINOPHEN 650 MG: 325 TABLET ORAL at 00:53

## 2023-03-02 RX ADMIN — ENOXAPARIN SODIUM 40 MG: 40 INJECTION SUBCUTANEOUS at 09:20

## 2023-03-02 RX ADMIN — IBUPROFEN 600 MG: 600 TABLET, FILM COATED ORAL at 06:32

## 2023-03-02 RX ADMIN — IBUPROFEN 600 MG: 600 TABLET, FILM COATED ORAL at 00:53

## 2023-03-02 RX ADMIN — DOCUSATE SODIUM 100 MG: 100 CAPSULE, LIQUID FILLED ORAL at 09:20

## 2023-03-02 RX ADMIN — ACETAMINOPHEN 650 MG: 325 TABLET ORAL at 06:32

## 2023-03-02 RX ADMIN — VARICELLA VIRUS VACCINE LIVE 0.5 ML: 1350 INJECTION, POWDER, LYOPHILIZED, FOR SUSPENSION SUBCUTANEOUS at 09:57

## 2023-03-02 NOTE — PLAN OF CARE
Problem: PAIN - ADULT  Goal: Verbalizes/displays adequate comfort level or baseline comfort level  Description: Interventions:  - Encourage patient to monitor pain and request assistance  - Assess pain using appropriate pain scale  - Administer analgesics based on type and severity of pain and evaluate response  - Implement non-pharmacological measures as appropriate and evaluate response  - Consider cultural and social influences on pain and pain management  - Notify physician/advanced practitioner if interventions unsuccessful or patient reports new pain  Outcome: Completed     Problem: INFECTION - ADULT  Goal: Absence or prevention of progression during hospitalization  Description: INTERVENTIONS:  - Assess and monitor for signs and symptoms of infection  - Monitor lab/diagnostic results  - Monitor all insertion sites, i e  indwelling lines, tubes, and drains  - Monitor endotracheal if appropriate and nasal secretions for changes in amount and color  - [place] appropriate cooling/warming therapies per order  - Administer medications as ordered  - Instruct and encourage patient and family to use good hand hygiene technique  - Identify and instruct in appropriate isolation precautions for identified infection/condition  Outcome: Completed  Goal: Absence of fever/infection during neutropenic period  Description: INTERVENTIONS:  - Monitor WBC    Outcome: Completed     Problem: SAFETY ADULT  Goal: Patient will remain free of falls  Description: INTERVENTIONS:  - Educate patient/family on patient safety including physical limitations  - Instruct patient to call for assistance with activity   - Consult OT/PT to assist with strengthening/mobility   - Keep Call bell within reach  - Keep bed low and locked with side rails adjusted as appropriate  - Keep care items and personal belongings within reach  - Initiate and maintain comfort rounds  Outcome: Completed  Goal: Maintain or return to baseline ADL function  Description: INTERVENTIONS:  -  Assess patient's ability to carry out ADLs; assess patient's baseline for ADL function and identify physical deficits which impact ability to perform ADLs (bathing, care of mouth/teeth, toileting, grooming, dressing, etc )  - Assess/evaluate cause of self-care deficits   - Assess range of motion  - Assess patient's mobility; develop plan if impaired  - Assess patient's need for assistive devices and provide as appropriate  - Encourage maximum independence but intervene and supervise when necessary  - Involve family in performance of ADLs  - Assess for home care needs following discharge   - Consider OT consult to assist with ADL evaluation and planning for discharge  - Provide patient education as appropriate  Outcome: Completed  Goal: Maintains/Returns to pre admission functional level  Description: INTERVENTIONS:  - Perform BMAT or MOVE assessment daily    - Set and communicate daily mobility goal to care team and patient/family/caregiver     - Collaborate with rehabilitation services on mobility goals if consulted  Outcome: Completed     Problem: DISCHARGE PLANNING  Goal: Discharge to home or other facility with appropriate resources  Description: INTERVENTIONS:  - Identify barriers to discharge w/patient and caregiver  - Arrange for needed discharge resources and transportation as appropriate  - Identify discharge learning needs (meds, wound care, etc )  - Arrange for interpretive services to assist at discharge as needed  - Refer to Case Management Department for coordinating discharge planning if the patient needs post-hospital services based on physician/advanced practitioner order or complex needs related to functional status, cognitive ability, or social support system  Outcome: Completed     Problem: Knowledge Deficit  Goal: Patient/family/caregiver demonstrates understanding of disease process, treatment plan, medications, and discharge instructions  Description: Complete learning assessment and assess knowledge base    Interventions:  - Provide teaching at level of understanding  - Provide teaching via preferred learning methods  Outcome: Completed     Problem: POSTPARTUM  Goal: Experiences normal postpartum course  Description: INTERVENTIONS:  - Monitor maternal vital signs  - Assess uterine involution and lochia  Outcome: Completed  Goal: Appropriate maternal -  bonding  Description: INTERVENTIONS:  - Identify family support  - Assess for appropriate maternal/infant bonding   -Encourage maternal/infant bonding opportunities  - Referral to  or  as needed  Outcome: Completed  Goal: Establishment of infant feeding pattern  Description: INTERVENTIONS:  - Assess breast/bottle feeding  - Refer to lactation as needed  Outcome: Completed  Goal: Incision(s), wounds(s) or drain site(s) healing without S/S of infection  Description: INTERVENTIONS  - Assess and document dressing, incision, wound bed, drain sites and surrounding tissue  - Provide patient and family education    Outcome: Completed

## 2023-03-02 NOTE — PROGRESS NOTES
Progress Note - OB/GYN  Jodi Jones 27 y o  female MRN: [id number]  Unit/Bed#: -01 Encounter: [id number]    Assessment and Plan     Jodi Jones is a patient of: [address]  She is PPD# 3 s/p  primary  section, low transverse incision  Recovering well and is stable       Maternal varicella, non-immune  Assessment & Plan  Varicella non-immune  · Varivax ordered    Elevated blood pressure reading without diagnosis of hypertension  Assessment & Plan  141/77 in office 02/15/23 then 141/77 in triage, not  by 4 hr   PreE labs wnl on admission  Normotensive this admission  Systolic (68HST), FIN:704 , Min:117 , JFN:607   Diastolic (00NPV), GWI:06, Min:56, Max:77    · Monitor BP  · Monitor for signs/symptoms of HTN / PreE    * Status post primary low transverse  section  Assessment & Plan   mL  Hgb 10 8 --> 8 1 --> s/p Venofer  Pain well controlled  Voiding spontaneously  No gas or BM since surgery  DVT ppx with SCDs and Lovenox 40 mg daily (BMI 39)  Breastfeeding  Lochia wnl  · Monitor for return of bowel function  · Continue routine postpartum care      Disposition    - Anticipate discharge home on PPD# 3      Subjective/Objective     Chief Complaint: Postpartum State     Subjective:    Jodi Jones is PPD/POD#3 s/p  primary  section, low transverse incision and bilateral salpingectomy for di-di twins with transverse-transverse presentations  She has no current complaints  Pain is well controlled  Patient is currently voiding  She is ambulating  Patient is currently passing flatus and has had no bowel movement  She is tolerating PO, and denies nausea or vomitting  Patient denies fever, chills, chest pain, shortness of breath, or calf tenderness  Lochia is normal  She is  Bottle feeding  She is recovering well and is stable         Vitals:   /56 (BP Location: Left arm)   Pulse 60   Temp 97 5 °F (36 4 °C) (Oral)   Resp 18   Ht 5' 6" (1 676 m)   Wt 109 kg (241 lb)   LMP 06/05/2022   SpO2 98%   Breastfeeding No   BMI 38 90 kg/m²       Intake/Output Summary (Last 24 hours) at 3/2/2023 2303  Last data filed at 3/1/2023 4680  Gross per 24 hour   Intake --   Output 550 ml   Net -550 ml       Invasive Devices     None                 Physical Exam:   GEN: Jesusita Moore appears well, alert and oriented x 3, pleasant and cooperative   CARDIO: RRR, no murmurs or rubs  RESP:  CTAB, no wheezes or rales  ABDOMEN: soft, no tenderness, no distention, fundus @ umbilicus, Incision C/D/I  EXTREMITIES: SCDs on, non tender, no erythema      Labs:     Hemoglobin   Date Value Ref Range Status   02/28/2023 8 1 (L) 11 5 - 15 4 g/dL Final   02/27/2023 10 8 (L) 11 5 - 15 4 g/dL Final     WBC   Date Value Ref Range Status   02/28/2023 12 56 (H) 4 31 - 10 16 Thousand/uL Final   02/27/2023 6 79 4 31 - 10 16 Thousand/uL Final     Platelets   Date Value Ref Range Status   02/28/2023 158 149 - 390 Thousands/uL Final   02/27/2023 179 149 - 390 Thousands/uL Final     Creatinine   Date Value Ref Range Status   02/27/2023 0 62 0 60 - 1 30 mg/dL Final     Comment:     Standardized to IDMS reference method   02/15/2023 0 58 (L) 0 60 - 1 30 mg/dL Final     Comment:     Standardized to IDMS reference method     AST   Date Value Ref Range Status   02/27/2023 21 13 - 39 U/L Final     Comment:     Specimen collection should occur prior to Sulfasalazine administration due to the potential for falsely depressed results  02/15/2023 19 13 - 39 U/L Final     Comment:     Specimen collection should occur prior to Sulfasalazine administration due to the potential for falsely depressed results  ALT   Date Value Ref Range Status   02/27/2023 15 7 - 52 U/L Final     Comment:     Specimen collection should occur prior to Sulfasalazine administration due to the potential for falsely depressed results      02/15/2023 13 7 - 52 U/L Final     Comment:     Specimen collection should occur prior to Sulfasalazine administration due to the potential for falsely depressed results             Memo Garza DO  3/2/2023  6:07 AM

## 2023-03-02 NOTE — PLAN OF CARE
Problem: PAIN - ADULT  Goal: Verbalizes/displays adequate comfort level or baseline comfort level  Description: Interventions:  - Encourage patient to monitor pain and request assistance  - Assess pain using appropriate pain scale  - Administer analgesics based on type and severity of pain and evaluate response  - Implement non-pharmacological measures as appropriate and evaluate response  - Consider cultural and social influences on pain and pain management  - Notify physician/advanced practitioner if interventions unsuccessful or patient reports new pain  Outcome: Progressing     Problem: INFECTION - ADULT  Goal: Absence or prevention of progression during hospitalization  Description: INTERVENTIONS:  - Assess and monitor for signs and symptoms of infection  - Monitor lab/diagnostic results  - Monitor all insertion sites, i e  indwelling lines, tubes, and drains  - Monitor endotracheal if appropriate and nasal secretions for changes in amount and color  - [place] appropriate cooling/warming therapies per order  - Administer medications as ordered  - Instruct and encourage patient and family to use good hand hygiene technique  - Identify and instruct in appropriate isolation precautions for identified infection/condition  Outcome: Progressing  Goal: Absence of fever/infection during neutropenic period  Description: INTERVENTIONS:  - Monitor WBC    Outcome: Progressing     Problem: SAFETY ADULT  Goal: Patient will remain free of falls  Description: INTERVENTIONS:  - Educate patient/family on patient safety including physical limitations  - Instruct patient to call for assistance with activity   - Consult OT/PT to assist with strengthening/mobility   - Keep Call bell within reach  - Keep bed low and locked with side rails adjusted as appropriate  - Keep care items and personal belongings within reach  - Initiate and maintain comfort rounds  Outcome: Progressing  Goal: Maintain or return to baseline ADL function  Description: INTERVENTIONS:  -  Assess patient's ability to carry out ADLs; assess patient's baseline for ADL function and identify physical deficits which impact ability to perform ADLs (bathing, care of mouth/teeth, toileting, grooming, dressing, etc )  - Assess/evaluate cause of self-care deficits   - Assess range of motion  - Assess patient's mobility; develop plan if impaired  - Assess patient's need for assistive devices and provide as appropriate  - Encourage maximum independence but intervene and supervise when necessary  - Involve family in performance of ADLs  - Assess for home care needs following discharge   - Consider OT consult to assist with ADL evaluation and planning for discharge  - Provide patient education as appropriate  Outcome: Progressing  Goal: Maintains/Returns to pre admission functional level  Description: INTERVENTIONS:  - Perform BMAT or MOVE assessment daily    - Set and communicate daily mobility goal to care team and patient/family/caregiver     - Collaborate with rehabilitation services on mobility goals if consulted  Outcome: Progressing     Problem: DISCHARGE PLANNING  Goal: Discharge to home or other facility with appropriate resources  Description: INTERVENTIONS:  - Identify barriers to discharge w/patient and caregiver  - Arrange for needed discharge resources and transportation as appropriate  - Identify discharge learning needs (meds, wound care, etc )  - Arrange for interpretive services to assist at discharge as needed  - Refer to Case Management Department for coordinating discharge planning if the patient needs post-hospital services based on physician/advanced practitioner order or complex needs related to functional status, cognitive ability, or social support system  Outcome: Progressing     Problem: Knowledge Deficit  Goal: Patient/family/caregiver demonstrates understanding of disease process, treatment plan, medications, and discharge instructions  Description: Complete learning assessment and assess knowledge base    Interventions:  - Provide teaching at level of understanding  - Provide teaching via preferred learning methods  Outcome: Progressing     Problem: POSTPARTUM  Goal: Experiences normal postpartum course  Description: INTERVENTIONS:  - Monitor maternal vital signs  - Assess uterine involution and lochia  Outcome: Progressing  Goal: Appropriate maternal -  bonding  Description: INTERVENTIONS:  - Identify family support  - Assess for appropriate maternal/infant bonding   -Encourage maternal/infant bonding opportunities  - Referral to  or  as needed  Outcome: Progressing  Goal: Establishment of infant feeding pattern    Description: INTERVENTIONS:  - Assess breast/bottle feeding  - Refer to lactation as needed  Outcome: Progressing  Goal: Incision(s), wounds(s) or drain site(s) healing without S/S of infection  Description: INTERVENTIONS  - Assess and document dressing, incision, wound bed, drain sites and surrounding tissue  - Provide patient and family education    Outcome: Progressing

## 2023-03-09 ENCOUNTER — POSTPARTUM VISIT (OUTPATIENT)
Dept: OBGYN CLINIC | Facility: CLINIC | Age: 33
End: 2023-03-09

## 2023-03-09 VITALS
WEIGHT: 216 LBS | BODY MASS INDEX: 34.72 KG/M2 | SYSTOLIC BLOOD PRESSURE: 132 MMHG | HEIGHT: 66 IN | DIASTOLIC BLOOD PRESSURE: 78 MMHG

## 2023-03-09 DIAGNOSIS — Z98.891 STATUS POST PRIMARY LOW TRANSVERSE CESAREAN SECTION: Primary | ICD-10-CM

## 2023-03-09 NOTE — PROGRESS NOTES
Miguel Angel Carney is a 27 y o  female who presents to the clinic 2 weeks status post 1LTCS with b/l salpingectomy for term twin preg  Eating a regular diet without difficulty  Bowel movements are normal  Pain is controlled without any medications  Min lochia, both babies are doing well    The following portions of the patient's history were reviewed and updated as appropriate: allergies, current medications, past family history, past medical history, past social history, past surgical history and problem list     Review of Systems  Pertinent items are noted in HPI  Objective     /78 (BP Location: Right arm, Patient Position: Sitting, Cuff Size: Standard)   Ht 5' 6" (1 676 m)   Wt 98 kg (216 lb)   LMP 06/05/2022   Breastfeeding No   BMI 34 86 kg/m²   General:  alert and oriented, in no acute distress   Abdomen: soft, non-tender   Incision:   healing well, no drainage, no erythema, no hernia, no seroma, no swelling, no dehiscence, incision well approximated         Assessment      Doing well postoperatively  Operative findings again reviewed  Pathology report discussed  Plan     1  Continue any current medications  2  Wound care discussed  3  Activity restrictions: no lifting more than 5 pounds  4  Anticipated return to work: 8 weeks  5  Follow up: 4 routine PP visit

## 2023-04-06 ENCOUNTER — POSTPARTUM VISIT (OUTPATIENT)
Dept: OBGYN CLINIC | Facility: CLINIC | Age: 33
End: 2023-04-06

## 2023-04-06 VITALS
DIASTOLIC BLOOD PRESSURE: 80 MMHG | BODY MASS INDEX: 32.47 KG/M2 | HEIGHT: 66 IN | WEIGHT: 202 LBS | SYSTOLIC BLOOD PRESSURE: 130 MMHG

## 2023-04-06 DIAGNOSIS — M62.08 DIASTASIS RECTI: ICD-10-CM

## 2023-04-06 PROBLEM — Z98.891 STATUS POST PRIMARY LOW TRANSVERSE CESAREAN SECTION: Status: RESOLVED | Noted: 2022-10-05 | Resolved: 2023-04-06

## 2023-04-06 PROBLEM — O32.2XX0 TRANSVERSE PRESENTATION, ANTEPARTUM: Status: RESOLVED | Noted: 2023-02-07 | Resolved: 2023-04-06

## 2023-04-06 PROBLEM — O30.043 DICHORIONIC DIAMNIOTIC TWIN PREGNANCY IN THIRD TRIMESTER: Status: RESOLVED | Noted: 2022-08-31 | Resolved: 2023-04-06

## 2023-04-06 PROBLEM — R03.0 ELEVATED BLOOD PRESSURE READING WITHOUT DIAGNOSIS OF HYPERTENSION: Status: RESOLVED | Noted: 2023-02-15 | Resolved: 2023-04-06

## 2023-04-06 NOTE — PROGRESS NOTES
"Assessment/Plan     Normal postpartum exam      1  Contraception: tubal ligation  2  Annual exam due in July  3  Referral to pelvic floor PT placed  4  Increase activity as tolerated, may resume all normal activity at 6 weeks postpartum  5  Anticipated return to work: 6 - 12 weeks post partum  Cahse Herndon is a 27 y o  female who presents for a postpartum visit  She is 6 weeks postpartum following a spontaneous vaginal delivery  I have fully reviewed the prenatal and intrapartum course  The delivery was at 45 gestational weeks  Anesthesia: spinal  Laceration: none  Bleeding no bleeding  Bowel function is normal  Bladder function is normal  Patient has not been sexually active  Desired contraception method is tubal ligation  Postpartum depression screening: negative  EPDS : 1    Baby's course has been uneventful  Baby is feeding by bottle - formula  Last Pap : 2022 ; no abnormalities  Gestational Diabetes: no  Pregnancy Complications: twins    The following portions of the patient's history were reviewed and updated as appropriate: allergies, current medications, past family history, past medical history, past social history, past surgical history and problem list       Current Outpatient Medications:   •  Prenatal Vit-Fe Fumarate-FA (PRENATAL VITAMIN PO), Take by mouth, Disp: , Rfl:     No Known Allergies    Review of Systems  Constitutional: no fever, feels well  Breasts: no complaints of breast pain, breast lump, or nipple discharge  Gastrointestinal: no complaints nausea, vomiting  Genitourinary: as noted in HPI    Neurological: no complaints of headache      Objective      /80 (BP Location: Right arm, Patient Position: Sitting, Cuff Size: Large)   Ht 5' 6\" (1 676 m)   Wt 91 6 kg (202 lb)   LMP 06/05/2022   Breastfeeding No   BMI 32 60 kg/m²     OBGyn Exam  General: alert and oriented, in no acute distress  Abdomen: soft, nondistended and nontender  Incision: " clean, dry, and intact

## 2023-07-07 ENCOUNTER — ANNUAL EXAM (OUTPATIENT)
Dept: OBGYN CLINIC | Facility: CLINIC | Age: 33
End: 2023-07-07
Payer: COMMERCIAL

## 2023-07-07 VITALS
BODY MASS INDEX: 32.75 KG/M2 | DIASTOLIC BLOOD PRESSURE: 78 MMHG | HEIGHT: 66 IN | WEIGHT: 203.8 LBS | SYSTOLIC BLOOD PRESSURE: 122 MMHG

## 2023-07-07 DIAGNOSIS — Z01.419 WOMEN'S ANNUAL ROUTINE GYNECOLOGICAL EXAMINATION: Primary | ICD-10-CM

## 2023-07-07 PROCEDURE — 99395 PREV VISIT EST AGE 18-39: CPT | Performed by: OBSTETRICS & GYNECOLOGY

## 2023-07-07 RX ORDER — DIPHENOXYLATE HYDROCHLORIDE AND ATROPINE SULFATE 2.5; .025 MG/1; MG/1
1 TABLET ORAL DAILY
COMMUNITY

## 2023-07-07 NOTE — PROGRESS NOTES
ASSESSMENT & PLAN:   Diagnoses and all orders for this visit:    Women's annual routine gynecological examination    Other orders  -     multivitamin (THERAGRAN) TABS; Take 1 tablet by mouth daily      HMB - discussed considering Mirena IUD    The following were reviewed in today's visit: ASCCP guidelines, Gardisil vaccination, STD testing breast self exam, exercise and healthy diet. Patient to return to office in yearly for annual exam.     All questions have been answered to her satisfaction. CC:  Annual Gynecologic Examination  Chief Complaint   Patient presents with   • Gynecologic Exam     Annual exam, pap not indicated. Pt is well, states there are no concerns. Last pap- 2022 Neg/neg        HPI: Lorie Yoo is a 26 y.o. [id number] who presents for annual gynecologic examination. She has the following concerns:  Periods have been heavier      Health Maintenance:    Exercise: intermittently  Breast exams/breast awareness: yes  Diet: well balanced diet      Past Medical History:   Diagnosis Date   • Varicella     immunized       Past Surgical History:   Procedure Laterality Date   • [facility]  DELIVERY ONLY N/A 2023    Procedure:  SECTION (); Surgeon: Perry Valle MD;  [facility];  Service: Obstetrics   • [facility] LIG/TRNSXJ [facility] TUBE  DEL/ABDML SURG  2023    Procedure: LIGATION/COAGULATION TUBAL;  Surgeon: Perry Valle MD;  Location: [facility];  Service: Obstetrics       Past OB/Gyn History:   Patient's last menstrual period was 2023 (exact date). Last Pap:  : no abnormalities  History of abnormal Pap smear: no  HPV vaccine completed: yes    Patient is currently sexually active.    STD testing: no  Current contraception: tubal ligation      Family History  Family History   Problem Relation Age of Onset   • No Known Problems Mother    • No Known Problems Father    • No Known Problems Sister    • No Known Problems Brother • No Known Problems Brother    • No Known Problems Son    • No Known Problems Son    • Lung cancer Maternal Grandmother    • Cancer Maternal Grandfather         bladder   • No Known Problems Paternal Grandmother    • No Known Problems Paternal Grandfather        Family history of uterine or ovarian cancer: no  Family history of breast cancer: no  Family history of colon cancer: no    Social History:  Social History     Socioeconomic History   • Marital status: /Civil Union     Spouse name: Not on file   • Number of children: Not on file   • Years of education: Not on file   • Highest education level: Not on file   Occupational History   • Not on file   Tobacco Use   • Smoking status: Never   • Smokeless tobacco: Never   Vaping Use   • Vaping Use: Never used   Substance and Sexual Activity   • Alcohol use: Yes     Alcohol/week: 1.0 standard drink of alcohol     Types: 1 Standard drinks or equivalent per week     Comment: occasionally   • Drug use: Never   • Sexual activity: Yes     Partners: Male     Birth control/protection: Female Sterilization   Other Topics Concern   • Not on file   Social History Narrative   • Not on file     Social Determinants of Health     Financial Resource Strain: Not on file   Food Insecurity: Not on file   Transportation Needs: Not on file   Physical Activity: Not on file   Stress: Not on file   Social Connections: Not on file   Intimate Partner Violence: Not on file   Housing Stability: Not on file     Domestic violence screen: negative    Allergies:  No Known Allergies    Medications:    Current Outpatient Medications:   •  multivitamin (THERAGRAN) TABS, Take 1 tablet by mouth daily, Disp: , Rfl:   •  Prenatal Vit-Fe Fumarate-FA (PRENATAL VITAMIN PO), Take by mouth (Patient not taking: Reported on 7/7/2023), Disp: , Rfl:     Review of Systems:  Review of Systems   Constitutional: Negative. HENT: Negative. Respiratory: Negative. Cardiovascular: Negative. Gastrointestinal: Negative. Genitourinary: Positive for menstrual problem. Neurological: Negative. Psychiatric/Behavioral: Negative. Physical Exam:  /78 (BP Location: Left arm, Patient Position: Sitting, Cuff Size: Standard)   Ht 5' 6" (1.676 m)   Wt 92.4 kg (203 lb 12.8 oz)   LMP 06/18/2023 (Exact Date)   BMI 32.89 kg/m²    Physical Exam  Constitutional:       Appearance: Normal appearance. Genitourinary:      Bladder and urethral meatus normal.      No lesions in the vagina. Right Labia: No rash, tenderness, lesions, skin changes or Bartholin's cyst.     Left Labia: No tenderness, lesions, skin changes, Bartholin's cyst or rash. No vaginal erythema, tenderness or bleeding. Right Adnexa: not tender, not full and no mass present. Left Adnexa: not tender, not full and no mass present. Cervix is parous. No cervical motion tenderness, discharge, lesion or polyp. Uterus is not enlarged, fixed or tender. No uterine mass detected. Urethral meatus caruncle not present. No urethral tenderness or mass present. Breasts:     Right: No swelling, bleeding, inverted nipple, mass, nipple discharge, skin change or tenderness. Left: No swelling, bleeding, inverted nipple, mass, nipple discharge, skin change or tenderness. HENT:      Head: Normocephalic and atraumatic. Eyes:      Extraocular Movements: Extraocular movements intact. Conjunctiva/sclera: Conjunctivae normal.      Pupils: Pupils are equal, round, and reactive to light. Cardiovascular:      Rate and Rhythm: Normal rate and regular rhythm. Heart sounds: Normal heart sounds. No murmur heard. Pulmonary:      Effort: Pulmonary effort is normal. No respiratory distress. Breath sounds: Normal breath sounds. No wheezing or rales. Abdominal:      General: There is no distension. Palpations: Abdomen is soft. Tenderness: There is no abdominal tenderness.  There is no guarding. Neurological:      General: No focal deficit present. Mental Status: She is alert and oriented to person, place, and time. Skin:     General: Skin is warm and dry. Psychiatric:         Mood and Affect: Mood normal.         Behavior: Behavior normal.   Vitals and nursing note reviewed.

## (undated) DEVICE — ENSEAL 20 CM SHAFT, LARGE JAW: Brand: ENSEAL X1

## (undated) DEVICE — Device

## (undated) DEVICE — SUT MONOCRYL 0 CTX 36 IN Y398H

## (undated) DEVICE — ADHESIVE SKIN HIGH VISCOSITY EXOFIN 1ML

## (undated) DEVICE — SUT STRATAFIX SPIRAL PDS PLUS 1 CTX 18 IN SXPP1A400

## (undated) DEVICE — CHLORAPREP HI-LITE 26ML ORANGE

## (undated) DEVICE — SUT STRATAFIX SPIRAL 4-0 PGA/PCL 30 X 30 CM SXMD2B409

## (undated) DEVICE — GLOVE INDICATOR PI UNDERGLOVE SZ 7 BLUE

## (undated) DEVICE — SUT PLAIN 2-0 CTX 27 IN 872H

## (undated) DEVICE — PACK C-SECTION PBDS

## (undated) DEVICE — SKIN MARKER DUAL TIP WITH RULER CAP, FLEXIBLE RULER AND LABELS: Brand: DEVON

## (undated) DEVICE — TELFA NON-ADHERENT ABSORBENT DRESSING: Brand: TELFA